# Patient Record
Sex: FEMALE | Race: WHITE | NOT HISPANIC OR LATINO | URBAN - METROPOLITAN AREA
[De-identification: names, ages, dates, MRNs, and addresses within clinical notes are randomized per-mention and may not be internally consistent; named-entity substitution may affect disease eponyms.]

---

## 2023-03-08 LAB
ALANINE AMINOTRANSFERASE (SGPT) (U/L) IN SER/PLAS: 13 U/L (ref 7–45)
ALBUMIN (G/DL) IN SER/PLAS: 4.2 G/DL (ref 3.4–5)
ALKALINE PHOSPHATASE (U/L) IN SER/PLAS: 68 U/L (ref 33–136)
ASPARTATE AMINOTRANSFERASE (SGOT) (U/L) IN SER/PLAS: 15 U/L (ref 9–39)
BASOPHILS (10*3/UL) IN BLOOD BY AUTOMATED COUNT: 0.02 X10E9/L (ref 0–0.1)
BASOPHILS/100 LEUKOCYTES IN BLOOD BY AUTOMATED COUNT: 0.2 % (ref 0–2)
BILIRUBIN DIRECT (MG/DL) IN SER/PLAS: 0.1 MG/DL (ref 0–0.3)
BILIRUBIN TOTAL (MG/DL) IN SER/PLAS: 0.3 MG/DL (ref 0–1.2)
C REACTIVE PROTEIN (MG/L) IN SER/PLAS: 0.94 MG/DL
CREATININE (MG/DL) IN SER/PLAS: 0.84 MG/DL (ref 0.5–1.05)
EOSINOPHILS (10*3/UL) IN BLOOD BY AUTOMATED COUNT: 0.05 X10E9/L (ref 0–0.4)
EOSINOPHILS/100 LEUKOCYTES IN BLOOD BY AUTOMATED COUNT: 0.5 % (ref 0–6)
ERYTHROCYTE DISTRIBUTION WIDTH (RATIO) BY AUTOMATED COUNT: 13.7 % (ref 11.5–14.5)
ERYTHROCYTE MEAN CORPUSCULAR HEMOGLOBIN CONCENTRATION (G/DL) BY AUTOMATED: 31.8 G/DL (ref 32–36)
ERYTHROCYTE MEAN CORPUSCULAR VOLUME (FL) BY AUTOMATED COUNT: 104 FL (ref 80–100)
ERYTHROCYTES (10*6/UL) IN BLOOD BY AUTOMATED COUNT: 4.07 X10E12/L (ref 4–5.2)
GFR FEMALE: 73 ML/MIN/1.73M2
HEMATOCRIT (%) IN BLOOD BY AUTOMATED COUNT: 42.4 % (ref 36–46)
HEMOGLOBIN (G/DL) IN BLOOD: 13.5 G/DL (ref 12–16)
IMMATURE GRANULOCYTES/100 LEUKOCYTES IN BLOOD BY AUTOMATED COUNT: 0.4 % (ref 0–0.9)
LEUKOCYTES (10*3/UL) IN BLOOD BY AUTOMATED COUNT: 10.5 X10E9/L (ref 4.4–11.3)
LYMPHOCYTES (10*3/UL) IN BLOOD BY AUTOMATED COUNT: 1.52 X10E9/L (ref 0.8–3)
LYMPHOCYTES/100 LEUKOCYTES IN BLOOD BY AUTOMATED COUNT: 14.5 % (ref 13–44)
MONOCYTES (10*3/UL) IN BLOOD BY AUTOMATED COUNT: 0.71 X10E9/L (ref 0.05–0.8)
MONOCYTES/100 LEUKOCYTES IN BLOOD BY AUTOMATED COUNT: 6.8 % (ref 2–10)
NEUTROPHILS (10*3/UL) IN BLOOD BY AUTOMATED COUNT: 8.17 X10E9/L (ref 1.6–5.5)
NEUTROPHILS/100 LEUKOCYTES IN BLOOD BY AUTOMATED COUNT: 77.6 % (ref 40–80)
NRBC (PER 100 WBCS) BY AUTOMATED COUNT: 0 /100 WBC (ref 0–0)
PLATELETS (10*3/UL) IN BLOOD AUTOMATED COUNT: 334 X10E9/L (ref 150–450)
PROTEIN TOTAL: 7 G/DL (ref 6.4–8.2)

## 2023-04-21 LAB
ALANINE AMINOTRANSFERASE (SGPT) (U/L) IN SER/PLAS: 8 U/L (ref 7–45)
ALBUMIN (G/DL) IN SER/PLAS: 4.1 G/DL (ref 3.4–5)
ALKALINE PHOSPHATASE (U/L) IN SER/PLAS: 66 U/L (ref 33–136)
ASPARTATE AMINOTRANSFERASE (SGOT) (U/L) IN SER/PLAS: 13 U/L (ref 9–39)
BASOPHILS (10*3/UL) IN BLOOD BY AUTOMATED COUNT: 0.02 X10E9/L (ref 0–0.1)
BASOPHILS/100 LEUKOCYTES IN BLOOD BY AUTOMATED COUNT: 0.2 % (ref 0–2)
BILIRUBIN DIRECT (MG/DL) IN SER/PLAS: 0.1 MG/DL (ref 0–0.3)
BILIRUBIN TOTAL (MG/DL) IN SER/PLAS: 0.3 MG/DL (ref 0–1.2)
C REACTIVE PROTEIN (MG/L) IN SER/PLAS: 1.64 MG/DL
CREATININE (MG/DL) IN SER/PLAS: 0.86 MG/DL (ref 0.5–1.05)
EOSINOPHILS (10*3/UL) IN BLOOD BY AUTOMATED COUNT: 0.07 X10E9/L (ref 0–0.4)
EOSINOPHILS/100 LEUKOCYTES IN BLOOD BY AUTOMATED COUNT: 0.7 % (ref 0–6)
ERYTHROCYTE DISTRIBUTION WIDTH (RATIO) BY AUTOMATED COUNT: 14.6 % (ref 11.5–14.5)
ERYTHROCYTE MEAN CORPUSCULAR HEMOGLOBIN CONCENTRATION (G/DL) BY AUTOMATED: 31.7 G/DL (ref 32–36)
ERYTHROCYTE MEAN CORPUSCULAR VOLUME (FL) BY AUTOMATED COUNT: 105 FL (ref 80–100)
ERYTHROCYTES (10*6/UL) IN BLOOD BY AUTOMATED COUNT: 3.89 X10E12/L (ref 4–5.2)
GFR FEMALE: 71 ML/MIN/1.73M2
HEMATOCRIT (%) IN BLOOD BY AUTOMATED COUNT: 41 % (ref 36–46)
HEMOGLOBIN (G/DL) IN BLOOD: 13 G/DL (ref 12–16)
IMMATURE GRANULOCYTES/100 LEUKOCYTES IN BLOOD BY AUTOMATED COUNT: 0.3 % (ref 0–0.9)
LEUKOCYTES (10*3/UL) IN BLOOD BY AUTOMATED COUNT: 10.2 X10E9/L (ref 4.4–11.3)
LYMPHOCYTES (10*3/UL) IN BLOOD BY AUTOMATED COUNT: 1.37 X10E9/L (ref 0.8–3)
LYMPHOCYTES/100 LEUKOCYTES IN BLOOD BY AUTOMATED COUNT: 13.4 % (ref 13–44)
MONOCYTES (10*3/UL) IN BLOOD BY AUTOMATED COUNT: 0.61 X10E9/L (ref 0.05–0.8)
MONOCYTES/100 LEUKOCYTES IN BLOOD BY AUTOMATED COUNT: 6 % (ref 2–10)
NEUTROPHILS (10*3/UL) IN BLOOD BY AUTOMATED COUNT: 8.1 X10E9/L (ref 1.6–5.5)
NEUTROPHILS/100 LEUKOCYTES IN BLOOD BY AUTOMATED COUNT: 79.4 % (ref 40–80)
NRBC (PER 100 WBCS) BY AUTOMATED COUNT: 0 /100 WBC (ref 0–0)
PLATELETS (10*3/UL) IN BLOOD AUTOMATED COUNT: 363 X10E9/L (ref 150–450)
PROTEIN TOTAL: 7.2 G/DL (ref 6.4–8.2)

## 2023-05-18 LAB
CREATININE (MG/DL) IN SER/PLAS: 0.81 MG/DL (ref 0.5–1.05)
GFR FEMALE: 76 ML/MIN/1.73M2
POC CALCIUM IONIZED (MMOL/L) IN BLOOD: 1.24 MMOL/L (ref 1.1–1.33)

## 2023-08-11 ENCOUNTER — HOSPITAL ENCOUNTER (OUTPATIENT)
Dept: DATA CONVERSION | Facility: HOSPITAL | Age: 73
Discharge: HOME | End: 2023-08-11

## 2023-08-11 DIAGNOSIS — Z85.118 PERSONAL HISTORY OF OTHER MALIGNANT NEOPLASM OF BRONCHUS AND LUNG: ICD-10-CM

## 2023-09-12 ENCOUNTER — HOSPITAL ENCOUNTER (OUTPATIENT)
Dept: DATA CONVERSION | Facility: HOSPITAL | Age: 73
Discharge: HOME | End: 2023-09-12
Payer: MEDICARE

## 2023-09-12 DIAGNOSIS — M21.061 VALGUS DEFORMITY, NOT ELSEWHERE CLASSIFIED, RIGHT KNEE: ICD-10-CM

## 2023-09-12 DIAGNOSIS — M17.11 UNILATERAL PRIMARY OSTEOARTHRITIS, RIGHT KNEE: ICD-10-CM

## 2023-10-27 DIAGNOSIS — M06.9 RHEUMATOID ARTHRITIS, INVOLVING UNSPECIFIED SITE, UNSPECIFIED WHETHER RHEUMATOID FACTOR PRESENT (MULTI): Primary | ICD-10-CM

## 2023-10-27 RX ORDER — PREDNISONE 1 MG/1
3 TABLET ORAL DAILY
Qty: 90 TABLET | Refills: 2 | Status: SHIPPED | OUTPATIENT
Start: 2023-10-27 | End: 2024-02-02 | Stop reason: SDUPTHER

## 2023-10-27 RX ORDER — PREDNISONE 1 MG/1
3 TABLET ORAL DAILY
COMMUNITY
End: 2023-10-27 | Stop reason: SDUPTHER

## 2023-11-02 ENCOUNTER — PHARMACY VISIT (OUTPATIENT)
Dept: PHARMACY | Facility: CLINIC | Age: 73
End: 2023-11-02
Payer: COMMERCIAL

## 2023-11-02 PROCEDURE — RXOTC WILLOW AMBULATORY OTC CHARGE

## 2023-11-02 PROCEDURE — RXMED WILLOW AMBULATORY MEDICATION CHARGE

## 2023-11-02 RX ORDER — HYDROCODONE BITARTRATE AND ACETAMINOPHEN 5; 325 MG/1; MG/1
TABLET ORAL
Qty: 90 TABLET | Refills: 0 | OUTPATIENT
Start: 2023-11-02 | End: 2023-11-13 | Stop reason: ALTCHOICE

## 2023-11-09 RX ORDER — CYANOCOBALAMIN 1000 UG/ML
1000 INJECTION, SOLUTION INTRAMUSCULAR; SUBCUTANEOUS
COMMUNITY
Start: 2023-09-06

## 2023-11-09 RX ORDER — CYCLOBENZAPRINE HCL 5 MG
10 TABLET ORAL 3 TIMES DAILY PRN
COMMUNITY
End: 2023-11-13 | Stop reason: ALTCHOICE

## 2023-11-09 RX ORDER — DENOSUMAB 60 MG/ML
60 INJECTION SUBCUTANEOUS ONCE
COMMUNITY
Start: 2018-04-06 | End: 2023-11-13 | Stop reason: ALTCHOICE

## 2023-11-09 RX ORDER — LIDOCAINE 560 MG/1
1 PATCH PERCUTANEOUS; TOPICAL; TRANSDERMAL DAILY
COMMUNITY
Start: 2022-06-07

## 2023-11-09 RX ORDER — CHOLESTYRAMINE 4 G/9G
4 POWDER, FOR SUSPENSION ORAL 2 TIMES DAILY
COMMUNITY
Start: 2023-09-06 | End: 2023-11-13 | Stop reason: ALTCHOICE

## 2023-11-09 RX ORDER — METOPROLOL TARTRATE 25 MG/1
25 TABLET, FILM COATED ORAL 2 TIMES DAILY
COMMUNITY

## 2023-11-09 RX ORDER — CHOLECALCIFEROL (VITAMIN D3) 25 MCG
1 TABLET ORAL DAILY
COMMUNITY
Start: 2022-04-26

## 2023-11-09 RX ORDER — NITROGLYCERIN 0.4 MG/1
0.4 TABLET SUBLINGUAL EVERY 5 MIN PRN
COMMUNITY
Start: 2015-01-21

## 2023-11-09 RX ORDER — HYDROXYCHLOROQUINE SULFATE 200 MG/1
200 TABLET, FILM COATED ORAL
COMMUNITY
End: 2023-11-13 | Stop reason: ALTCHOICE

## 2023-11-09 RX ORDER — ALBUTEROL SULFATE 90 UG/1
2 AEROSOL, METERED RESPIRATORY (INHALATION) EVERY 6 HOURS PRN
COMMUNITY
Start: 2022-12-01

## 2023-11-09 RX ORDER — PNV NO.95/FERROUS FUM/FOLIC AC 28MG-0.8MG
1 TABLET ORAL DAILY
COMMUNITY
Start: 2021-10-05 | End: 2023-11-13 | Stop reason: ALTCHOICE

## 2023-11-09 RX ORDER — UMECLIDINIUM BROMIDE AND VILANTEROL TRIFENATATE 62.5; 25 UG/1; UG/1
1 POWDER RESPIRATORY (INHALATION) DAILY
COMMUNITY

## 2023-11-09 RX ORDER — HYDROCODONE BITARTRATE AND ACETAMINOPHEN 5; 325 MG/1; MG/1
1 TABLET ORAL EVERY 6 HOURS PRN
COMMUNITY
Start: 2022-04-25

## 2023-11-09 RX ORDER — UMECLIDINIUM 62.5 UG/1
1 AEROSOL, POWDER ORAL EVERY 24 HOURS
COMMUNITY
End: 2023-11-13 | Stop reason: ALTCHOICE

## 2023-11-09 RX ORDER — AZATHIOPRINE 50 MG/1
50 TABLET ORAL DAILY
COMMUNITY
Start: 2015-01-27 | End: 2023-11-13 | Stop reason: ALTCHOICE

## 2023-11-09 RX ORDER — GABAPENTIN 400 MG/1
400 CAPSULE ORAL 2 TIMES DAILY
COMMUNITY

## 2023-11-09 RX ORDER — DICYCLOMINE HYDROCHLORIDE 10 MG/1
10 CAPSULE ORAL 3 TIMES DAILY
COMMUNITY
Start: 2023-05-10 | End: 2023-11-13 | Stop reason: ALTCHOICE

## 2023-11-09 RX ORDER — FUROSEMIDE 20 MG/1
20 TABLET ORAL EVERY OTHER DAY
COMMUNITY
Start: 2023-05-09

## 2023-11-09 RX ORDER — BROMFENAC 1.03 MG/ML
1 SOLUTION/ DROPS OPHTHALMIC 2 TIMES DAILY
COMMUNITY

## 2023-11-09 RX ORDER — NAPROXEN SODIUM 220 MG/1
81 TABLET, FILM COATED ORAL DAILY
COMMUNITY
Start: 2022-04-15

## 2023-11-11 PROBLEM — E53.8 VITAMIN B12 DEFICIENCY: Status: ACTIVE | Noted: 2023-11-11

## 2023-11-11 PROBLEM — Z99.81 OXYGEN DEPENDENT: Status: ACTIVE | Noted: 2023-11-11

## 2023-11-11 PROBLEM — I26.99 PULMONARY EMBOLUS (MULTI): Status: ACTIVE | Noted: 2023-11-11

## 2023-11-11 PROBLEM — R60.9 EDEMA: Status: ACTIVE | Noted: 2023-11-11

## 2023-11-11 PROBLEM — M79.7 FIBROMYALGIA: Status: ACTIVE | Noted: 2023-11-11

## 2023-11-11 PROBLEM — E63.9 NUTRITIONAL DEFICIENCY DISORDER: Status: ACTIVE | Noted: 2023-11-11

## 2023-11-11 PROBLEM — R93.7 ABNORMAL BONE DENSITY SCREENING: Status: ACTIVE | Noted: 2023-11-11

## 2023-11-11 PROBLEM — G89.18 POSTOPERATIVE PAIN: Status: ACTIVE | Noted: 2023-11-11

## 2023-11-11 PROBLEM — N95.9 MENOPAUSAL DISORDER: Status: ACTIVE | Noted: 2023-11-11

## 2023-11-11 PROBLEM — F32.A DEPRESSION: Status: ACTIVE | Noted: 2023-11-11

## 2023-11-11 PROBLEM — Z93.3 COLOSTOMY STATUS (MULTI): Status: ACTIVE | Noted: 2023-11-11

## 2023-11-11 PROBLEM — K57.32 DIVERTICULITIS OF SIGMOID COLON: Status: ACTIVE | Noted: 2023-11-11

## 2023-11-11 PROBLEM — R06.00 DYSPNEA: Status: ACTIVE | Noted: 2023-11-11

## 2023-11-11 PROBLEM — R73.09 BLOOD GLUCOSE ABNORMAL: Status: ACTIVE | Noted: 2023-11-11

## 2023-11-11 PROBLEM — I20.9 ANGINA PECTORIS (CMS-HCC): Status: ACTIVE | Noted: 2023-11-11

## 2023-11-11 PROBLEM — A41.9 SEPSIS (MULTI): Status: ACTIVE | Noted: 2023-11-11

## 2023-11-11 PROBLEM — F41.9 ANXIETY: Status: ACTIVE | Noted: 2023-11-11

## 2023-11-11 PROBLEM — R41.82 ALTERED MENTAL STATUS: Status: ACTIVE | Noted: 2023-11-11

## 2023-11-11 PROBLEM — M06.9 RHEUMATOID ARTHRITIS (MULTI): Status: ACTIVE | Noted: 2023-11-11

## 2023-11-11 PROBLEM — Z86.39 HISTORY OF DIABETES MELLITUS, TYPE II: Status: ACTIVE | Noted: 2023-11-11

## 2023-11-11 PROBLEM — G89.4 CHRONIC PAIN DISORDER: Status: ACTIVE | Noted: 2023-11-11

## 2023-11-11 PROBLEM — D72.9 WHITE BLOOD CELL DISORDER: Status: ACTIVE | Noted: 2023-11-11

## 2023-11-11 PROBLEM — M81.0 OSTEOPOROSIS: Status: ACTIVE | Noted: 2023-11-11

## 2023-11-11 PROBLEM — E03.9 ACQUIRED HYPOTHYROIDISM: Status: ACTIVE | Noted: 2023-11-11

## 2023-11-11 PROBLEM — F17.200 TOBACCO DEPENDENCE SYNDROME: Status: ACTIVE | Noted: 2023-11-11

## 2023-11-11 PROBLEM — J96.20 ACUTE ON CHRONIC RESPIRATORY FAILURE (MULTI): Status: ACTIVE | Noted: 2023-11-11

## 2023-11-11 PROBLEM — I73.9 PERIPHERAL VASCULAR DISEASE (CMS-HCC): Status: ACTIVE | Noted: 2023-11-11

## 2023-11-11 PROBLEM — E78.5 DYSLIPIDEMIA: Status: ACTIVE | Noted: 2023-11-11

## 2023-11-11 PROBLEM — K57.92 DIVERTICULITIS: Status: ACTIVE | Noted: 2023-11-11

## 2023-11-11 PROBLEM — K57.20 DIVERTICULITIS OF LARGE INTESTINE WITH PERFORATION WITHOUT BLEEDING: Status: ACTIVE | Noted: 2023-11-11

## 2023-11-11 PROBLEM — E55.9 VITAMIN D DEFICIENCY: Status: ACTIVE | Noted: 2023-11-11

## 2023-11-11 PROBLEM — K65.9 PERITONITIS (MULTI): Status: ACTIVE | Noted: 2023-11-11

## 2023-11-11 PROBLEM — R07.9 CHEST PAIN: Status: ACTIVE | Noted: 2023-11-11

## 2023-11-11 PROBLEM — E78.5 HYPERLIPIDEMIA: Status: ACTIVE | Noted: 2023-11-11

## 2023-11-11 PROBLEM — I10 HYPERTENSION: Status: ACTIVE | Noted: 2023-11-11

## 2023-11-11 PROBLEM — C34.90 SQUAMOUS CELL CARCINOMA OF LUNG (MULTI): Status: ACTIVE | Noted: 2023-11-11

## 2023-11-11 PROBLEM — E11.9 DIABETES (MULTI): Status: ACTIVE | Noted: 2023-11-11

## 2023-11-11 PROBLEM — R10.9 ABDOMINAL PAIN: Status: ACTIVE | Noted: 2023-11-11

## 2023-11-11 PROBLEM — M19.90 OSTEOARTHRITIS: Status: ACTIVE | Noted: 2023-11-11

## 2023-11-11 PROBLEM — J44.9 CHRONIC OBSTRUCTIVE PULMONARY DISEASE (MULTI): Status: ACTIVE | Noted: 2023-11-11

## 2023-11-11 RX ORDER — DENOSUMAB 60 MG/ML
60 INJECTION SUBCUTANEOUS
COMMUNITY

## 2023-11-11 RX ORDER — BLOOD SUGAR DIAGNOSTIC
STRIP MISCELLANEOUS
COMMUNITY
Start: 2017-11-30

## 2023-11-11 RX ORDER — BLOOD SUGAR DIAGNOSTIC
STRIP MISCELLANEOUS
COMMUNITY
Start: 2023-05-10

## 2023-11-11 RX ORDER — CETIRIZINE HYDROCHLORIDE 10 MG/1
10 TABLET, CHEWABLE ORAL DAILY
COMMUNITY
End: 2023-11-13 | Stop reason: ALTCHOICE

## 2023-11-11 RX ORDER — POLYETHYLENE GLYCOL 400 AND PROPYLENE GLYCOL 4; 3 MG/ML; MG/ML
1 SOLUTION/ DROPS OPHTHALMIC
COMMUNITY
End: 2023-11-13 | Stop reason: ALTCHOICE

## 2023-11-11 RX ORDER — PREDNISONE 1 MG/1
1 TABLET ORAL 3 TIMES DAILY
COMMUNITY
Start: 2015-02-24 | End: 2023-11-13 | Stop reason: ALTCHOICE

## 2023-11-11 RX ORDER — GABAPENTIN 400 MG/1
400 CAPSULE ORAL 2 TIMES DAILY
COMMUNITY
End: 2023-11-13 | Stop reason: ALTCHOICE

## 2023-11-13 ENCOUNTER — OFFICE VISIT (OUTPATIENT)
Dept: RHEUMATOLOGY | Facility: CLINIC | Age: 73
End: 2023-11-13
Payer: MEDICARE

## 2023-11-13 VITALS — BODY MASS INDEX: 25.57 KG/M2 | WEIGHT: 127 LBS | SYSTOLIC BLOOD PRESSURE: 116 MMHG | DIASTOLIC BLOOD PRESSURE: 58 MMHG

## 2023-11-13 DIAGNOSIS — M06.9 RHEUMATOID ARTHRITIS, INVOLVING UNSPECIFIED SITE, UNSPECIFIED WHETHER RHEUMATOID FACTOR PRESENT (MULTI): Primary | ICD-10-CM

## 2023-11-13 DIAGNOSIS — M81.0 OSTEOPOROSIS, UNSPECIFIED OSTEOPOROSIS TYPE, UNSPECIFIED PATHOLOGICAL FRACTURE PRESENCE: ICD-10-CM

## 2023-11-13 DIAGNOSIS — M19.90 OSTEOARTHRITIS, UNSPECIFIED OSTEOARTHRITIS TYPE, UNSPECIFIED SITE: ICD-10-CM

## 2023-11-13 PROCEDURE — 3074F SYST BP LT 130 MM HG: CPT | Performed by: INTERNAL MEDICINE

## 2023-11-13 PROCEDURE — 99214 OFFICE O/P EST MOD 30 MIN: CPT | Performed by: INTERNAL MEDICINE

## 2023-11-13 PROCEDURE — 1159F MED LIST DOCD IN RCRD: CPT | Performed by: INTERNAL MEDICINE

## 2023-11-13 PROCEDURE — 99214 OFFICE O/P EST MOD 30 MIN: CPT | Mod: PO | Performed by: INTERNAL MEDICINE

## 2023-11-13 PROCEDURE — 3078F DIAST BP <80 MM HG: CPT | Performed by: INTERNAL MEDICINE

## 2023-11-13 NOTE — PROGRESS NOTES
"Recheck    OA  /  RA  /  OP  ( due for Prolia in Dec )  Doing well overall.   Change in prednisone ( orange color ) with better results.    HPI - arthritis is \"not bad at all\"  No current pain.  She thinks this batch of pred is more effective than prev. Her R knee swells.  Her pain mgmt dr gave her injections with relief.  No other swelling.  Mild AM stifffness 1 hr.  No CP or resp.  Sometimes gets pain when her colostomy was reversed - plans on calling the surgeon    PE  NAD  RRR no r/m/g  CTA  Tr BLE edema  No synovitis    A/P - OA and RA - sx controlled with low-dose prednisone  She has not been able to marcelina hcq, metho, lef, AZA.  H/o lung CA and ruptured divertic abscess.  Could consider orencia if sx incr  OP - on prolia.  Recheck DEXA  "

## 2023-11-30 ENCOUNTER — PHARMACY VISIT (OUTPATIENT)
Dept: PHARMACY | Facility: CLINIC | Age: 73
End: 2023-11-30
Payer: COMMERCIAL

## 2023-11-30 PROCEDURE — RXMED WILLOW AMBULATORY MEDICATION CHARGE

## 2023-11-30 RX ORDER — HYDROCODONE BITARTRATE AND ACETAMINOPHEN 5; 325 MG/1; MG/1
TABLET ORAL
Qty: 90 TABLET | Refills: 0 | OUTPATIENT
Start: 2023-11-30 | End: 2023-12-28 | Stop reason: SDUPTHER

## 2023-11-30 RX ORDER — HYDROCODONE BITARTRATE AND ACETAMINOPHEN 5; 325 MG/1; MG/1
TABLET ORAL
Qty: 90 TABLET | Refills: 0 | OUTPATIENT
Start: 2023-11-30

## 2023-12-01 ENCOUNTER — LAB (OUTPATIENT)
Dept: LAB | Facility: LAB | Age: 73
End: 2023-12-01
Payer: MEDICARE

## 2023-12-01 DIAGNOSIS — M81.0 AGE-RELATED OSTEOPOROSIS WITHOUT CURRENT PATHOLOGICAL FRACTURE: Primary | ICD-10-CM

## 2023-12-01 LAB
CA-I BLD-SCNC: 1.2 MMOL/L (ref 1.1–1.33)
CREAT SERPL-MCNC: 0.89 MG/DL (ref 0.5–1.05)
GFR SERPL CREATININE-BSD FRML MDRD: 68 ML/MIN/1.73M*2

## 2023-12-01 PROCEDURE — 82565 ASSAY OF CREATININE: CPT

## 2023-12-01 PROCEDURE — 36415 COLL VENOUS BLD VENIPUNCTURE: CPT

## 2023-12-01 PROCEDURE — 82330 ASSAY OF CALCIUM: CPT

## 2023-12-05 DIAGNOSIS — M81.0 OSTEOPOROSIS, UNSPECIFIED OSTEOPOROSIS TYPE, UNSPECIFIED PATHOLOGICAL FRACTURE PRESENCE: Primary | ICD-10-CM

## 2023-12-05 RX ORDER — FAMOTIDINE 10 MG/ML
20 INJECTION INTRAVENOUS ONCE AS NEEDED
Status: CANCELLED | OUTPATIENT
Start: 2023-12-06

## 2023-12-05 RX ORDER — ALBUTEROL SULFATE 0.83 MG/ML
3 SOLUTION RESPIRATORY (INHALATION) AS NEEDED
Status: CANCELLED | OUTPATIENT
Start: 2023-12-06

## 2023-12-05 RX ORDER — DIPHENHYDRAMINE HYDROCHLORIDE 50 MG/ML
50 INJECTION INTRAMUSCULAR; INTRAVENOUS AS NEEDED
Status: CANCELLED | OUTPATIENT
Start: 2023-12-06

## 2023-12-05 RX ORDER — EPINEPHRINE 0.3 MG/.3ML
0.3 INJECTION SUBCUTANEOUS EVERY 5 MIN PRN
Status: CANCELLED | OUTPATIENT
Start: 2023-12-06

## 2023-12-08 ENCOUNTER — INFUSION (OUTPATIENT)
Dept: INFUSION THERAPY | Facility: CLINIC | Age: 73
End: 2023-12-08
Payer: MEDICARE

## 2023-12-08 VITALS
DIASTOLIC BLOOD PRESSURE: 72 MMHG | RESPIRATION RATE: 18 BRPM | TEMPERATURE: 98.4 F | OXYGEN SATURATION: 95 % | HEART RATE: 71 BPM | SYSTOLIC BLOOD PRESSURE: 127 MMHG

## 2023-12-08 DIAGNOSIS — M81.0 OSTEOPOROSIS, UNSPECIFIED OSTEOPOROSIS TYPE, UNSPECIFIED PATHOLOGICAL FRACTURE PRESENCE: ICD-10-CM

## 2023-12-08 PROCEDURE — 96372 THER/PROPH/DIAG INJ SC/IM: CPT | Mod: INF | Performed by: INTERNAL MEDICINE

## 2023-12-08 PROCEDURE — 2500000004 HC RX 250 GENERAL PHARMACY W/ HCPCS (ALT 636 FOR OP/ED): Mod: JZ | Performed by: INTERNAL MEDICINE

## 2023-12-08 RX ORDER — DIPHENHYDRAMINE HYDROCHLORIDE 50 MG/ML
50 INJECTION INTRAMUSCULAR; INTRAVENOUS AS NEEDED
Status: CANCELLED | OUTPATIENT
Start: 2024-06-05

## 2023-12-08 RX ORDER — EPINEPHRINE 0.3 MG/.3ML
0.3 INJECTION SUBCUTANEOUS EVERY 5 MIN PRN
Status: CANCELLED | OUTPATIENT
Start: 2024-06-05

## 2023-12-08 RX ORDER — OXYMETAZOLINE HCL 0.05 %
1 SPRAY, NON-AEROSOL (ML) NASAL 2 TIMES DAILY
COMMUNITY
Start: 2021-10-05

## 2023-12-08 RX ORDER — FAMOTIDINE 10 MG/ML
20 INJECTION INTRAVENOUS ONCE AS NEEDED
Status: CANCELLED | OUTPATIENT
Start: 2024-06-05

## 2023-12-08 RX ORDER — ISOSORBIDE MONONITRATE 30 MG/1
30 TABLET, EXTENDED RELEASE ORAL DAILY
COMMUNITY
Start: 2023-12-05

## 2023-12-08 RX ORDER — ALBUTEROL SULFATE 0.83 MG/ML
3 SOLUTION RESPIRATORY (INHALATION) AS NEEDED
Status: CANCELLED | OUTPATIENT
Start: 2024-06-05

## 2023-12-08 RX ADMIN — DENOSUMAB 60 MG: 60 INJECTION SUBCUTANEOUS at 14:03

## 2023-12-08 ASSESSMENT — ENCOUNTER SYMPTOMS
DEPRESSION: 0
OCCASIONAL FEELINGS OF UNSTEADINESS: 0
LOSS OF SENSATION IN FEET: 0

## 2023-12-08 ASSESSMENT — PAIN SCALES - GENERAL: PAINLEVEL: 4

## 2023-12-08 NOTE — PROGRESS NOTES
[facility]   infusion Clinic Note   Date: 2023   Name: Sania Rosenberg  : 1950   MRN: [id number]         Reason for Visit:   Injections (Prolia)      Accompanied by:Self   Visit Type:: injection   Diagnosis: Osteoporosis, unspecified osteoporosis type, unspecified pathological fracture presence    Allergies:   Allergies as of 2023 - Reviewed 2023   Allergen Reaction Noted    Leflunomide Anaphylaxis and Swelling 2023    Penicillins Hives and Anaphylaxis 2023    Acetaminophen-codeine Swelling 2023    Albuterol Other, Headache, and Dizziness 2023    Clindamycin Rash and Swelling 2023    Ibuprofen-famotidine Rash and Swelling 2023    Lanolin Swelling 2023    Tramadol Nausea/vomiting, GI Upset, and Rash 2023    Calamine Unknown 2023    Calamine-zinc oxide Unknown 2023    Lactose Unknown 2023    Losartan potassium Other 2023    Cephalexin Itching, Swelling, and Rash 2023    Ciprofloxacin Rash 2023    Clarithromycin GI Upset 2023    Codeine Rash 2023    Levofloxacin Rash 2023    Lisinopril Rash, Other, and GI Upset 2023    Methotrexate GI Upset and Rash 2023    Neomycin-bacitracin-polymyxin Rash 2023    Nicotine Itching 2023    Other Rash 2023    Oxycodone-acetaminophen Rash 2023    Pregabalin Rash 2023    Propoxyphene n-acetaminophen Rash 2023    Sulfa (sulfonamide antibiotics) Rash 2023    Sulfamethoxazole-trimethoprim Rash 2023    Tetracycline Rash 2023      Current Meds has a current medication list which includes the following prescription(s): accu-chek nadia plus test strp, albuterol, aspirin, accu-chek nadia plus test strp, cetirizine, cholecalciferol, cyanocobalamin, prolia, furosemide, gabapentin, isosorbide mononitrate er, metoprolol tartrate, oxygen, oxymetazoline, peg 400-propylene glycol,  prednisone, anoro ellipta, bromfenac, hydrocodone-acetaminophen, hydrocodone-acetaminophen, lidocaine, and nitrostat.        Vitals:  Vitals:    12/08/23 1355   BP: 127/72   Pulse: 71   Resp: 18   Temp: 36.9 °C (98.4 °F)   SpO2: 95%      Infusion Pre-procedure Checklist   Allergies reviewed: yes   Medications reviewed: yes   Contraindications to treatment: no   Previous reaction to current treatment: no   Current Health Issues: None   Pain: 4 [4]' Back [4]   Is the pain different from normal: No   Is the pain tolerable: Yes   Is your Doctor aware: Yes   Contraindications based on patient history: No   Provider notified: Not applicable   Labs:   Creatinine   Date Value Ref Range Status   12/01/2023 0.89 0.50 - 1.05 mg/dL Final      POCT Calcium, Ionized   Date Value Ref Range Status   12/01/2023 1.20 1.1 - 1.33 mmol/L Final     Comment:     The performance characteristics of ionized calcium tested  in heparinized plasma or serum have been validated by the  individual  laboratory site where testing is performed.   Testing on heparinized plasma or serum is not approved by   the FDA; however, such approval is not necessary.       Fall Risk Screening:      Review of Systems   All other systems reviewed and are negative.     Negative for complaint: [] all other systems have been reviewed and are negative for complaint   Infusion Readiness:   Assess patient for the concerns below. Document provider notification as appropriate:  - Does not meet criteria to treat No  - Has an active or recent infection with/without current antibiotic use No  - Has recent/planned dental work No  -Pt confirms calcium supplementation Yes    Initiated By: More Gregorio RN   Time: 2:04 PM     We administered denosumab.    Patient visit conducted today by More Gregorio RN for administration of Prolia Subcutaneous injection administered in left arm(s) and well-tolerated.

## 2023-12-08 NOTE — PATIENT INSTRUCTIONS
Today :We administered denosumab.     For:   1. Osteoporosis, unspecified osteoporosis type, unspecified pathological fracture presence         Your next appointment is due in:  6/5/2024        Please read the  Medication Guide that was given to you and reviewed during todays visit.     (Tell all doctors including dentists that you are taking this medication)     Go to the emergency room or call 911 if:  -You have signs of allergic reaction:   -Rash, hives, itching.   -Swollen, blistered, peeling skin.   -Swelling of face, lips, mouth, tongue or throat.   -Tightness of chest, trouble breathing, swallowing or talking     Call your doctor:  - If IV / injection site gets red, warm, swollen, itchy or leaks fluid or pus.     (Leave dressing on your IV site for at least 2 hours and keep area clean and dry  - If you get sick or have symptoms of infection or are not feeling well for any reason.    (Wash your hands often, stay away from people who are sick)  - If you have side effects from your medication that do not go away or are bothersome.     (Refer to the teaching your nurse gave you for side effects to call your doctor about)    - Common side effects may include:  stuffy nose, headache, feeling tired, muscle aches, upset stomach  - Before receiving any vaccines     - Call the [facility] at   If:  - You get sick, are on antibiotics, have had a recent vaccine, have surgery or dental work and your doctor wants your visit rescheduled.  - You need to cancel and reschedule your visit for any reason. Call at least 2 days before your visit if you need to cancel.   - Your insurance changes before your next visit.    (We will need to get approval from your new insurance. This can take up to two weeks.)     The [facility] is opened Monday thru Friday. We are closed on weekends and holidays.   Voice mail will take your call if the center is closed. If you leave a message please allow 24 hours for  a call back during weekdays. If you leave a message on a weekend/holiday, we will call you back the next business day.

## 2023-12-28 PROCEDURE — RXMED WILLOW AMBULATORY MEDICATION CHARGE

## 2023-12-30 ENCOUNTER — PHARMACY VISIT (OUTPATIENT)
Dept: PHARMACY | Facility: CLINIC | Age: 73
End: 2023-12-30
Payer: COMMERCIAL

## 2023-12-30 PROCEDURE — RXOTC WILLOW AMBULATORY OTC CHARGE

## 2024-01-02 ENCOUNTER — HOSPITAL ENCOUNTER (OUTPATIENT)
Dept: RADIOLOGY | Facility: HOSPITAL | Age: 74
Discharge: HOME | End: 2024-01-02
Payer: MEDICARE

## 2024-01-02 DIAGNOSIS — Z85.118 PERSONAL HISTORY OF OTHER MALIGNANT NEOPLASM OF BRONCHUS AND LUNG: ICD-10-CM

## 2024-01-02 PROCEDURE — 71250 CT THORAX DX C-: CPT

## 2024-01-31 PROCEDURE — RXMED WILLOW AMBULATORY MEDICATION CHARGE

## 2024-02-02 ENCOUNTER — PHARMACY VISIT (OUTPATIENT)
Dept: PHARMACY | Facility: CLINIC | Age: 74
End: 2024-02-02
Payer: COMMERCIAL

## 2024-02-02 DIAGNOSIS — M06.9 RHEUMATOID ARTHRITIS, INVOLVING UNSPECIFIED SITE, UNSPECIFIED WHETHER RHEUMATOID FACTOR PRESENT (MULTI): ICD-10-CM

## 2024-02-02 PROCEDURE — RXOTC WILLOW AMBULATORY OTC CHARGE

## 2024-02-02 RX ORDER — PREDNISONE 1 MG/1
3 TABLET ORAL DAILY
Qty: 90 TABLET | Refills: 1 | Status: SHIPPED | OUTPATIENT
Start: 2024-02-02 | End: 2024-04-04 | Stop reason: SDUPTHER

## 2024-02-12 ENCOUNTER — LAB (OUTPATIENT)
Dept: LAB | Facility: LAB | Age: 74
End: 2024-02-12
Payer: MEDICARE

## 2024-02-12 ENCOUNTER — OFFICE VISIT (OUTPATIENT)
Dept: RHEUMATOLOGY | Facility: CLINIC | Age: 74
End: 2024-02-12
Payer: MEDICARE

## 2024-02-12 VITALS — BODY MASS INDEX: 25.37 KG/M2 | SYSTOLIC BLOOD PRESSURE: 136 MMHG | DIASTOLIC BLOOD PRESSURE: 68 MMHG | WEIGHT: 126 LBS

## 2024-02-12 DIAGNOSIS — M06.9 RHEUMATOID ARTHRITIS, INVOLVING UNSPECIFIED SITE, UNSPECIFIED WHETHER RHEUMATOID FACTOR PRESENT (MULTI): Primary | ICD-10-CM

## 2024-02-12 DIAGNOSIS — I42.8 OTHER CARDIOMYOPATHIES (MULTI): ICD-10-CM

## 2024-02-12 DIAGNOSIS — I25.10 ATHEROSCLEROTIC HEART DISEASE OF NATIVE CORONARY ARTERY WITHOUT ANGINA PECTORIS: Primary | ICD-10-CM

## 2024-02-12 DIAGNOSIS — M19.90 OSTEOARTHRITIS, UNSPECIFIED OSTEOARTHRITIS TYPE, UNSPECIFIED SITE: ICD-10-CM

## 2024-02-12 DIAGNOSIS — M81.0 OSTEOPOROSIS, UNSPECIFIED OSTEOPOROSIS TYPE, UNSPECIFIED PATHOLOGICAL FRACTURE PRESENCE: ICD-10-CM

## 2024-02-12 LAB — BNP SERPL-MCNC: 90 PG/ML (ref 0–99)

## 2024-02-12 PROCEDURE — 83880 ASSAY OF NATRIURETIC PEPTIDE: CPT

## 2024-02-12 PROCEDURE — 99214 OFFICE O/P EST MOD 30 MIN: CPT | Performed by: INTERNAL MEDICINE

## 2024-02-12 PROCEDURE — 1125F AMNT PAIN NOTED PAIN PRSNT: CPT | Performed by: INTERNAL MEDICINE

## 2024-02-12 PROCEDURE — 36415 COLL VENOUS BLD VENIPUNCTURE: CPT

## 2024-02-12 PROCEDURE — 3075F SYST BP GE 130 - 139MM HG: CPT | Performed by: INTERNAL MEDICINE

## 2024-02-12 PROCEDURE — 3078F DIAST BP <80 MM HG: CPT | Performed by: INTERNAL MEDICINE

## 2024-02-12 NOTE — PROGRESS NOTES
"Recheck  OA  /  RA  /  OP  ( Prolia in Dec ) Doing well      >20 min late - thought appt was later  HPI - \"Not too bad\"  She incr pain \"everywhere\" with cold weather.  Her hands are ok.  Her legs are swelling - she was put on lasix.   AM stiffness until she moves around.  +CP - sees cardiol.  +SOB.   Intermittent GI sx since she had surg in 2022.      PE  NAD  RRR no r/m/g  CTA  No edema  No synovitis    A/P- OA and RA - doing well on low-dose pred.  She has been intol of mult meds  OP on prolia - she hasn't had her DEXA yet as ordered but plans on doing so  Reviewed prev labs  No need to check labs today  Reeval 3 mo  "

## 2024-02-16 ENCOUNTER — HOSPITAL ENCOUNTER (OUTPATIENT)
Dept: RADIOLOGY | Facility: HOSPITAL | Age: 74
Discharge: HOME | End: 2024-02-16
Payer: MEDICARE

## 2024-02-16 DIAGNOSIS — M25.552 PAIN IN LEFT HIP: ICD-10-CM

## 2024-02-16 DIAGNOSIS — M25.551 PAIN IN RIGHT HIP: ICD-10-CM

## 2024-02-16 PROCEDURE — 73521 X-RAY EXAM HIPS BI 2 VIEWS: CPT

## 2024-02-16 PROCEDURE — 73523 X-RAY EXAM HIPS BI 5/> VIEWS: CPT

## 2024-02-29 PROCEDURE — RXMED WILLOW AMBULATORY MEDICATION CHARGE

## 2024-03-01 ENCOUNTER — PHARMACY VISIT (OUTPATIENT)
Dept: PHARMACY | Facility: CLINIC | Age: 74
End: 2024-03-01
Payer: COMMERCIAL

## 2024-03-13 DIAGNOSIS — I25.10 ATHEROSCLEROTIC HEART DISEASE OF NATIVE CORONARY ARTERY WITHOUT ANGINA PECTORIS: Primary | ICD-10-CM

## 2024-03-13 DIAGNOSIS — I10 ESSENTIAL (PRIMARY) HYPERTENSION: ICD-10-CM

## 2024-03-13 DIAGNOSIS — R07.89 OTHER CHEST PAIN: ICD-10-CM

## 2024-03-14 ENCOUNTER — LAB (OUTPATIENT)
Dept: LAB | Facility: LAB | Age: 74
End: 2024-03-14
Payer: MEDICARE

## 2024-03-14 DIAGNOSIS — R07.89 OTHER CHEST PAIN: ICD-10-CM

## 2024-03-14 DIAGNOSIS — I10 ESSENTIAL (PRIMARY) HYPERTENSION: ICD-10-CM

## 2024-03-14 DIAGNOSIS — I25.10 ATHEROSCLEROTIC HEART DISEASE OF NATIVE CORONARY ARTERY WITHOUT ANGINA PECTORIS: ICD-10-CM

## 2024-03-14 LAB
ANION GAP SERPL CALC-SCNC: 11 MMOL/L
BASOPHILS # BLD AUTO: 0.03 X10*3/UL (ref 0–0.1)
BASOPHILS NFR BLD AUTO: 0.3 %
BUN SERPL-MCNC: 20 MG/DL (ref 8–25)
CALCIUM SERPL-MCNC: 9 MG/DL (ref 8.5–10.4)
CHLORIDE SERPL-SCNC: 103 MMOL/L (ref 97–107)
CO2 SERPL-SCNC: 28 MMOL/L (ref 24–31)
CREAT SERPL-MCNC: 0.9 MG/DL (ref 0.4–1.6)
EGFRCR SERPLBLD CKD-EPI 2021: 67 ML/MIN/1.73M*2
EOSINOPHIL # BLD AUTO: 0.11 X10*3/UL (ref 0–0.4)
EOSINOPHIL NFR BLD AUTO: 1.2 %
ERYTHROCYTE [DISTWIDTH] IN BLOOD BY AUTOMATED COUNT: 13.4 % (ref 11.5–14.5)
GLUCOSE SERPL-MCNC: 118 MG/DL (ref 65–99)
HCT VFR BLD AUTO: 43.1 % (ref 36–46)
HGB BLD-MCNC: 13.7 G/DL (ref 12–16)
IMM GRANULOCYTES # BLD AUTO: 0.04 X10*3/UL (ref 0–0.5)
IMM GRANULOCYTES NFR BLD AUTO: 0.4 % (ref 0–0.9)
INR PPP: 1 (ref 0.9–1.2)
LYMPHOCYTES # BLD AUTO: 1.48 X10*3/UL (ref 0.8–3)
LYMPHOCYTES NFR BLD AUTO: 15.6 %
MCH RBC QN AUTO: 32.2 PG (ref 26–34)
MCHC RBC AUTO-ENTMCNC: 31.8 G/DL (ref 32–36)
MCV RBC AUTO: 101 FL (ref 80–100)
MONOCYTES # BLD AUTO: 0.66 X10*3/UL (ref 0.05–0.8)
MONOCYTES NFR BLD AUTO: 7 %
NEUTROPHILS # BLD AUTO: 7.14 X10*3/UL (ref 1.6–5.5)
NEUTROPHILS NFR BLD AUTO: 75.5 %
NRBC BLD-RTO: 0 /100 WBCS (ref 0–0)
PLATELET # BLD AUTO: 289 X10*3/UL (ref 150–450)
POTASSIUM SERPL-SCNC: 4.5 MMOL/L (ref 3.4–5.1)
PROTHROMBIN TIME: 10.3 SECONDS (ref 9.3–12.7)
RBC # BLD AUTO: 4.25 X10*6/UL (ref 4–5.2)
SODIUM SERPL-SCNC: 142 MMOL/L (ref 133–145)
WBC # BLD AUTO: 9.5 X10*3/UL (ref 4.4–11.3)

## 2024-03-14 PROCEDURE — 36415 COLL VENOUS BLD VENIPUNCTURE: CPT

## 2024-03-14 PROCEDURE — 85610 PROTHROMBIN TIME: CPT

## 2024-03-14 PROCEDURE — 80048 BASIC METABOLIC PNL TOTAL CA: CPT

## 2024-03-14 PROCEDURE — 85025 COMPLETE CBC W/AUTO DIFF WBC: CPT

## 2024-03-18 ENCOUNTER — HOSPITAL ENCOUNTER (OUTPATIENT)
Facility: HOSPITAL | Age: 74
Setting detail: OUTPATIENT SURGERY
Discharge: HOME | End: 2024-03-18
Attending: INTERNAL MEDICINE | Admitting: INTERNAL MEDICINE
Payer: MEDICARE

## 2024-03-18 VITALS
RESPIRATION RATE: 18 BRPM | TEMPERATURE: 98 F | SYSTOLIC BLOOD PRESSURE: 164 MMHG | DIASTOLIC BLOOD PRESSURE: 68 MMHG | OXYGEN SATURATION: 100 % | HEART RATE: 56 BPM

## 2024-03-18 DIAGNOSIS — R07.9 CHEST PAIN, UNSPECIFIED: ICD-10-CM

## 2024-03-18 DIAGNOSIS — I20.0 UNSTABLE ANGINA (MULTI): ICD-10-CM

## 2024-03-18 PROCEDURE — 7100000010 HC PHASE TWO TIME - EACH INCREMENTAL 1 MINUTE: Performed by: INTERNAL MEDICINE

## 2024-03-18 PROCEDURE — 2500000004 HC RX 250 GENERAL PHARMACY W/ HCPCS (ALT 636 FOR OP/ED): Performed by: INTERNAL MEDICINE

## 2024-03-18 PROCEDURE — 99152 MOD SED SAME PHYS/QHP 5/>YRS: CPT | Performed by: INTERNAL MEDICINE

## 2024-03-18 PROCEDURE — 99153 MOD SED SAME PHYS/QHP EA: CPT | Performed by: INTERNAL MEDICINE

## 2024-03-18 PROCEDURE — 2720000007 HC OR 272 NO HCPCS: Performed by: INTERNAL MEDICINE

## 2024-03-18 PROCEDURE — C1894 INTRO/SHEATH, NON-LASER: HCPCS | Performed by: INTERNAL MEDICINE

## 2024-03-18 PROCEDURE — 2550000001 HC RX 255 CONTRASTS: Performed by: INTERNAL MEDICINE

## 2024-03-18 PROCEDURE — 93458 L HRT ARTERY/VENTRICLE ANGIO: CPT | Performed by: INTERNAL MEDICINE

## 2024-03-18 PROCEDURE — G0269 OCCLUSIVE DEVICE IN VEIN ART: HCPCS | Mod: TC | Performed by: INTERNAL MEDICINE

## 2024-03-18 PROCEDURE — 7100000009 HC PHASE TWO TIME - INITIAL BASE CHARGE: Performed by: INTERNAL MEDICINE

## 2024-03-18 PROCEDURE — 2500000005 HC RX 250 GENERAL PHARMACY W/O HCPCS: Performed by: INTERNAL MEDICINE

## 2024-03-18 RX ORDER — MIDAZOLAM HYDROCHLORIDE 1 MG/ML
INJECTION, SOLUTION INTRAMUSCULAR; INTRAVENOUS AS NEEDED
Status: DISCONTINUED | OUTPATIENT
Start: 2024-03-18 | End: 2024-03-18 | Stop reason: HOSPADM

## 2024-03-18 RX ORDER — FENTANYL CITRATE 50 UG/ML
INJECTION, SOLUTION INTRAMUSCULAR; INTRAVENOUS AS NEEDED
Status: DISCONTINUED | OUTPATIENT
Start: 2024-03-18 | End: 2024-03-18 | Stop reason: HOSPADM

## 2024-03-18 RX ORDER — LIDOCAINE HYDROCHLORIDE 10 MG/ML
INJECTION, SOLUTION EPIDURAL; INFILTRATION; INTRACAUDAL; PERINEURAL AS NEEDED
Status: DISCONTINUED | OUTPATIENT
Start: 2024-03-18 | End: 2024-03-18 | Stop reason: HOSPADM

## 2024-03-18 RX ORDER — IODIXANOL 320 MG/ML
INJECTION, SOLUTION INTRAVASCULAR AS NEEDED
Status: DISCONTINUED | OUTPATIENT
Start: 2024-03-18 | End: 2024-03-18 | Stop reason: HOSPADM

## 2024-03-18 ASSESSMENT — PAIN SCALES - GENERAL
PAINLEVEL_OUTOF10: 0 - NO PAIN
PAINLEVEL_OUTOF10: 0 - NO PAIN
PAINLEVEL_OUTOF10: 3
PAINLEVEL_OUTOF10: 0 - NO PAIN

## 2024-03-18 ASSESSMENT — COLUMBIA-SUICIDE SEVERITY RATING SCALE - C-SSRS
1. IN THE PAST MONTH, HAVE YOU WISHED YOU WERE DEAD OR WISHED YOU COULD GO TO SLEEP AND NOT WAKE UP?: NO
6. HAVE YOU EVER DONE ANYTHING, STARTED TO DO ANYTHING, OR PREPARED TO DO ANYTHING TO END YOUR LIFE?: NO
2. HAVE YOU ACTUALLY HAD ANY THOUGHTS OF KILLING YOURSELF?: NO

## 2024-03-18 ASSESSMENT — PAIN - FUNCTIONAL ASSESSMENT
PAIN_FUNCTIONAL_ASSESSMENT: 0-10

## 2024-03-18 NOTE — POST-PROCEDURE NOTE
Physician Transition of Care Summary  Invasive Cardiovascular Lab    Procedure Date: 3/18/2024  Attending:    * Jerad Doherty - Primary  Resident/Fellow/Other Assistant: Surgeon(s) and Role:    Indications:   Pre-op Diagnosis     * Chest pain, unspecified [R07.9]    Post-procedure diagnosis:   Post-op Diagnosis     * Chest pain, unspecified [R07.9]    Procedure(s):   Left Heart Cath, No LV  99392 - AZ CATH PLMT L HRT & ARTS W/NJX & ANGIO IMG S&I        Procedure Findings:   #1 left heart catheterization showed widely patent stent in the proximal right coronary artery with no evidence of inducible stenosis.  There is evidence of 40% eccentric plaque in the distal right coronary artery.  The left descending coronary artery is no significant the sclerosis of the distal LAD is a very small caliber vessel   Left circumflex coronary is a nondominant vessel in the proximal segment of the first obtuse marginal branch has a 60 to 70% focal stenosis.    description of the Procedure:   Cardiac cath report    Complications:   None    Stents/Implants:       Anticoagulation/Antiplatelet Plan:   Continue aspirin indefinitely    Estimated Blood Loss:   * No values recorded between 3/18/2024 11:20 AM and 3/18/2024 12:27 PM *    Anesthesia: Moderate Sedation Anesthesia Staff: No anesthesia staff entered.    Any Specimen(s) Removed:   No specimens collected during this procedure.    Disposition:   Home      Electronically signed by: Jerad Doherty MD, 3/18/2024 12:27 PM

## 2024-03-20 ASSESSMENT — PAIN SCALES - GENERAL: PAINLEVEL_OUTOF10: 0 - NO PAIN

## 2024-03-28 PROCEDURE — RXMED WILLOW AMBULATORY MEDICATION CHARGE

## 2024-03-29 ENCOUNTER — PHARMACY VISIT (OUTPATIENT)
Dept: PHARMACY | Facility: CLINIC | Age: 74
End: 2024-03-29
Payer: COMMERCIAL

## 2024-03-29 PROCEDURE — RXOTC WILLOW AMBULATORY OTC CHARGE

## 2024-04-04 DIAGNOSIS — M06.9 RHEUMATOID ARTHRITIS, INVOLVING UNSPECIFIED SITE, UNSPECIFIED WHETHER RHEUMATOID FACTOR PRESENT (MULTI): ICD-10-CM

## 2024-04-04 RX ORDER — PREDNISONE 1 MG/1
3 TABLET ORAL DAILY
Qty: 90 TABLET | Refills: 1 | Status: SHIPPED | OUTPATIENT
Start: 2024-04-04 | End: 2024-06-03 | Stop reason: SDUPTHER

## 2024-04-26 ENCOUNTER — DOCUMENTATION (OUTPATIENT)
Dept: INFUSION THERAPY | Facility: CLINIC | Age: 74
End: 2024-04-26
Payer: MEDICARE

## 2024-04-26 DIAGNOSIS — M81.0 OSTEOPOROSIS, UNSPECIFIED OSTEOPOROSIS TYPE, UNSPECIFIED PATHOLOGICAL FRACTURE PRESENCE: Primary | ICD-10-CM

## 2024-04-26 PROCEDURE — RXMED WILLOW AMBULATORY MEDICATION CHARGE

## 2024-04-26 NOTE — PROGRESS NOTES
Patient to be scheduled for Continuation of Prolia injections.  For Diagnosis: Osteoporosis    Labs.. 11/12/2024  Calcium drawn on: Ionized Ca+ 1.17  Lab Results   Component Value Date    CALCIUM 9.0 03/14/2024    PHOS 2.2 (L) 01/19/2019      Lab Results   Component Value Date    CAION 1.20 12/01/2023      Lab Results   Component Value Date    GLUCOSE 118 (H) 03/14/2024    CALCIUM 9.0 03/14/2024     03/14/2024    K 4.5 03/14/2024    CO2 28 03/14/2024     03/14/2024    BUN 20 03/14/2024    CREATININE 0.90 03/14/2024        Chemistry    Lab Results   Component Value Date/Time     03/14/2024 1355    K 4.5 03/14/2024 1355     03/14/2024 1355    CO2 28 03/14/2024 1355    BUN 20 03/14/2024 1355    CREATININE 0.90 03/14/2024 1355    Lab Results   Component Value Date/Time    CALCIUM 9.0 03/14/2024 1355    ALKPHOS 66 04/20/2023 1326    AST 13 04/20/2023 1326    ALT 8 04/20/2023 1326    BILITOT 0.3 04/20/2023 1326           (>8.6mg/dl or ionized calcium WNL.  Hold / Contact provider if <8.6) (must be within 28 days of scheduled injection)    Lab Results   Component Value Date    CREATININE 0.90 03/14/2024        Crcl: 51.80 ml/min  (Patients with a crcl <30 are at increased risk of hypocalcemia)      *Not a hard stop. If crcl < 30 pt to discuss supplementation with prescribing provider.    Calcium and Vitamin D supplement on medication list? Yes    Current Outpatient Medications:     Accu-Chek Aicha Plus test strp strip, 100 Strips once daily. Use as instructed to test blood sugars once daily., Disp: , Rfl:     albuterol 90 mcg/actuation inhaler, Inhale 2 puffs every 6 hours if needed., Disp: , Rfl:     aspirin 81 mg chewable tablet, Chew 1 tablet (81 mg) once daily., Disp: , Rfl:     blood sugar diagnostic (Accu-Chek Aicha Plus test strp) strip, use as directed once daily., Disp: , Rfl:     bromfenac (Xibrom) 0.09 % ophthalmic solution, Administer 1 drop into both eyes 2 times a day., Disp: , Rfl:      cetirizine (ZyrTEC) 10 mg capsule, Take 1 capsule (10 mg) by mouth 2 times a day., Disp: , Rfl:     cholecalciferol (Vitamin D-3) 25 MCG (1000 UT) tablet, Take 1 tablet (25 mcg) by mouth once daily., Disp: , Rfl:     cyanocobalamin (Vitamin B-12) 1,000 mcg/mL injection, Inject 1 mL (1,000 mcg) into the muscle every 30 (thirty) days., Disp: , Rfl:     denosumab (Prolia) 60 mg/mL syringe, Inject 1 mL (60 mg) under the skin every 6 months., Disp: , Rfl:     furosemide (Lasix) 20 mg tablet, Take 1 tablet (20 mg) by mouth every other day., Disp: , Rfl:     gabapentin (Neurontin) 400 mg capsule, Take 1 capsule (400 mg) by mouth 2 times a day., Disp: , Rfl:     HYDROcodone-acetaminophen (Norco) 5-325 mg tablet, Take 1 tablet by mouth every 6 hours if needed., Disp: , Rfl:     HYDROcodone-acetaminophen (Norco) 5-325 mg tablet, Take 1 tablet by mouth every 8 hours as needed for 30 days, Disp: 90 tablet, Rfl: 0    isosorbide mononitrate ER (Imdur) 30 mg 24 hr tablet, Take 1 tablet (30 mg) by mouth once daily., Disp: , Rfl:     lidocaine 4 % patch, Place 1 patch on the skin once daily., Disp: , Rfl:     metoprolol tartrate (Lopressor) 25 mg tablet, Take 1 tablet (25 mg) by mouth 2 times a day., Disp: , Rfl:     Nitrostat 0.4 mg SL tablet, Place 1 tablet (0.4 mg) under the tongue every 5 minutes if needed., Disp: , Rfl:     oxygen (O2) gas therapy, once daily at bedtime., Disp: , Rfl:     oxymetazoline 0.05 % nasal spray, Administer 1 spray into affected nostril(s) twice a day., Disp: , Rfl:     peg 400-propylene glycol (SYSTANE) 0.4-0.3 % drops ophthalmic drops, Administer 2 drops into both eyes 2 times a day as needed., Disp: , Rfl:     predniSONE (Deltasone) 1 mg tablet, Take 3 tablets (3 mg) by mouth once daily., Disp: 90 tablet, Rfl: 1    umeclidinium-vilanteroL (Anoro Ellipta) 62.5-25 mcg/actuation blister with device, Inhale 1 puff once daily., Disp: , Rfl:    (if no nurse to encourage discussion of supplementation  at visit)    No obvious recent dental work per chart review. Nurse to confirm no dental within past/next 4 weeks at encounter.        Last injection received: 6/5/2024 (if continuation)    Due: 12/10/2024      This result meets treatment criteria. Ok to schedule for prolia within 28 days of the calcium lab draw date listed above. OR… Ok to schedule for prolia; please instruct pt to have labs drawn no more than 28 days prior to their scheduled appointment

## 2024-04-27 ENCOUNTER — PHARMACY VISIT (OUTPATIENT)
Dept: PHARMACY | Facility: CLINIC | Age: 74
End: 2024-04-27
Payer: COMMERCIAL

## 2024-05-13 ENCOUNTER — LAB (OUTPATIENT)
Dept: LAB | Facility: LAB | Age: 74
End: 2024-05-13
Payer: MEDICARE

## 2024-05-13 ENCOUNTER — OFFICE VISIT (OUTPATIENT)
Dept: RHEUMATOLOGY | Facility: CLINIC | Age: 74
End: 2024-05-13
Payer: MEDICARE

## 2024-05-13 VITALS — BODY MASS INDEX: 26.37 KG/M2 | SYSTOLIC BLOOD PRESSURE: 126 MMHG | DIASTOLIC BLOOD PRESSURE: 66 MMHG | WEIGHT: 131 LBS

## 2024-05-13 DIAGNOSIS — M81.0 OSTEOPOROSIS, UNSPECIFIED OSTEOPOROSIS TYPE, UNSPECIFIED PATHOLOGICAL FRACTURE PRESENCE: ICD-10-CM

## 2024-05-13 DIAGNOSIS — L98.9 SKIN LESION: ICD-10-CM

## 2024-05-13 DIAGNOSIS — M06.9 RHEUMATOID ARTHRITIS, INVOLVING UNSPECIFIED SITE, UNSPECIFIED WHETHER RHEUMATOID FACTOR PRESENT (MULTI): Primary | ICD-10-CM

## 2024-05-13 DIAGNOSIS — M19.90 OSTEOARTHRITIS, UNSPECIFIED OSTEOARTHRITIS TYPE, UNSPECIFIED SITE: ICD-10-CM

## 2024-05-13 LAB
25(OH)D3 SERPL-MCNC: 50 NG/ML (ref 30–100)
ANION GAP SERPL CALC-SCNC: 14 MMOL/L (ref 10–20)
BUN SERPL-MCNC: 20 MG/DL (ref 6–23)
CA-I BLD-SCNC: 1.25 MMOL/L (ref 1.1–1.33)
CALCIUM SERPL-MCNC: 9.6 MG/DL (ref 8.6–10.6)
CHLORIDE SERPL-SCNC: 102 MMOL/L (ref 98–107)
CO2 SERPL-SCNC: 31 MMOL/L (ref 21–32)
CREAT SERPL-MCNC: 0.84 MG/DL (ref 0.5–1.05)
EGFRCR SERPLBLD CKD-EPI 2021: 73 ML/MIN/1.73M*2
GLUCOSE SERPL-MCNC: 114 MG/DL (ref 74–99)
POTASSIUM SERPL-SCNC: 4.5 MMOL/L (ref 3.5–5.3)
SODIUM SERPL-SCNC: 142 MMOL/L (ref 136–145)

## 2024-05-13 PROCEDURE — 36415 COLL VENOUS BLD VENIPUNCTURE: CPT

## 2024-05-13 PROCEDURE — 99214 OFFICE O/P EST MOD 30 MIN: CPT | Performed by: INTERNAL MEDICINE

## 2024-05-13 PROCEDURE — 3074F SYST BP LT 130 MM HG: CPT | Performed by: INTERNAL MEDICINE

## 2024-05-13 PROCEDURE — 1159F MED LIST DOCD IN RCRD: CPT | Performed by: INTERNAL MEDICINE

## 2024-05-13 PROCEDURE — 80048 BASIC METABOLIC PNL TOTAL CA: CPT

## 2024-05-13 PROCEDURE — 82330 ASSAY OF CALCIUM: CPT

## 2024-05-13 PROCEDURE — 3078F DIAST BP <80 MM HG: CPT | Performed by: INTERNAL MEDICINE

## 2024-05-13 PROCEDURE — 82306 VITAMIN D 25 HYDROXY: CPT

## 2024-05-13 NOTE — PROGRESS NOTES
Recheck  OA  /  RA  /  OP  ( Due for Prolia - Tript )  doing well     HPI - she had one day with hand pain when it was raining, but overall, her joints are ok.  R knee sometimes swells.   Pain mgmt drained and injected it last mo.    No recent CP.  Usual SOB.  Sees cardiol.  No change in GI.      PE  NAD  RRR no r/m/g  CTA  No edema  No synovitis  NT and no pain with ROM throughout  Hyperkeratotic and scaly irreg patch RLE - pt states non-healing    A/P - OA and RA, stable on low-dose pred (unable to marcelina mult meds)  OP - on prolia.  She still hasn't repeated her DEXA  Check labs  Derm eval for leg lesions  Reeval 6 mo or sooner PRN

## 2024-05-29 PROCEDURE — RXMED WILLOW AMBULATORY MEDICATION CHARGE

## 2024-05-30 PROCEDURE — RXMED WILLOW AMBULATORY MEDICATION CHARGE

## 2024-05-31 ENCOUNTER — PHARMACY VISIT (OUTPATIENT)
Dept: PHARMACY | Facility: CLINIC | Age: 74
End: 2024-05-31
Payer: COMMERCIAL

## 2024-05-31 PROCEDURE — RXOTC WILLOW AMBULATORY OTC CHARGE

## 2024-06-03 DIAGNOSIS — M06.9 RHEUMATOID ARTHRITIS, INVOLVING UNSPECIFIED SITE, UNSPECIFIED WHETHER RHEUMATOID FACTOR PRESENT (MULTI): ICD-10-CM

## 2024-06-03 RX ORDER — PREDNISONE 1 MG/1
3 TABLET ORAL DAILY
Qty: 90 TABLET | Refills: 1 | Status: SHIPPED | OUTPATIENT
Start: 2024-06-03

## 2024-06-05 ENCOUNTER — INFUSION (OUTPATIENT)
Dept: INFUSION THERAPY | Facility: CLINIC | Age: 74
End: 2024-06-05
Payer: MEDICARE

## 2024-06-05 VITALS
OXYGEN SATURATION: 97 % | RESPIRATION RATE: 18 BRPM | WEIGHT: 134.8 LBS | SYSTOLIC BLOOD PRESSURE: 152 MMHG | HEART RATE: 69 BPM | TEMPERATURE: 98.1 F | BODY MASS INDEX: 27.14 KG/M2 | DIASTOLIC BLOOD PRESSURE: 58 MMHG

## 2024-06-05 DIAGNOSIS — M81.0 OSTEOPOROSIS, UNSPECIFIED OSTEOPOROSIS TYPE, UNSPECIFIED PATHOLOGICAL FRACTURE PRESENCE: ICD-10-CM

## 2024-06-05 PROCEDURE — 96372 THER/PROPH/DIAG INJ SC/IM: CPT | Performed by: NURSE PRACTITIONER

## 2024-06-05 RX ORDER — DIPHENHYDRAMINE HYDROCHLORIDE 50 MG/ML
50 INJECTION INTRAMUSCULAR; INTRAVENOUS AS NEEDED
OUTPATIENT
Start: 2024-12-02

## 2024-06-05 RX ORDER — ALBUTEROL SULFATE 0.83 MG/ML
3 SOLUTION RESPIRATORY (INHALATION) AS NEEDED
OUTPATIENT
Start: 2024-12-02

## 2024-06-05 RX ORDER — EPINEPHRINE 0.3 MG/.3ML
0.3 INJECTION SUBCUTANEOUS EVERY 5 MIN PRN
OUTPATIENT
Start: 2024-12-02

## 2024-06-05 RX ORDER — FAMOTIDINE 10 MG/ML
20 INJECTION INTRAVENOUS ONCE AS NEEDED
OUTPATIENT
Start: 2024-12-02

## 2024-06-05 ASSESSMENT — PAIN SCALES - GENERAL: PAINLEVEL: 6

## 2024-06-05 NOTE — PROGRESS NOTES
[facility]   Infusion Clinic Note   Date: 2024   Name: Sania Rosenberg  : 1950   MRN: [id number]         Reason for Visit: OP Infusion (Prolia)         Today: We administered denosumab.       Visit Type: INJECTION       Ordered By: Dr. Pham      Accompanied by:      Diagnosis: Osteoporosis, unspecified osteoporosis type, unspecified pathological fracture presence       Allergies:   Allergies as of 2024 - Reviewed 2024   Allergen Reaction Noted    Leflunomide Anaphylaxis and Swelling 2023    Penicillins Hives and Anaphylaxis 2023    Acetaminophen-codeine Swelling 2023    Albuterol Other, Headache, and Dizziness 2023    Clindamycin Rash and Swelling 2023    Ibuprofen-famotidine Rash and Swelling 2023    Lanolin Swelling 2023    Tramadol Nausea/vomiting, GI Upset, and Rash 2023    Calamine Unknown 2023    Calamine-zinc oxide Unknown 2023    Lactose Unknown 2023    Losartan potassium Other 2023    Cephalexin Itching, Swelling, and Rash 2023    Ciprofloxacin Rash 2023    Clarithromycin GI Upset 2023    Codeine Rash 2023    Levofloxacin Rash 2023    Lisinopril Rash, Other, and GI Upset 2023    Methotrexate GI Upset and Rash 2023    Neomycin-bacitracin-polymyxin Rash 2023    Nicotine Itching 2023    Other Rash 2023    Oxycodone-acetaminophen Rash 2023    Pregabalin Rash 2023    Propoxyphene n-acetaminophen Rash 2023    Sulfa (sulfonamide antibiotics) Rash 2023    Sulfamethoxazole-trimethoprim Rash 2023    Tetracycline Rash 2023         Current Medications has a current medication list which includes the following prescription(s): accu-chek nadia plus test strp, albuterol, aspirin, accu-chek nadia plus test strp, bromfenac, cetirizine, cholecalciferol, cyanocobalamin, prolia, furosemide, gabapentin,  gabapentin, hydrocodone-acetaminophen, hydrocodone-acetaminophen, isosorbide mononitrate er, lidocaine, metoprolol tartrate, nitrostat, oxygen, oxymetazoline, peg 400-propylene glycol, prednisone, and anoro ellipta.       Vitals:   Vitals:    06/05/24 1351   BP: 152/58   Pulse: 69   Resp: 18   Temp: 36.7 °C (98.1 °F)   TempSrc: Temporal   SpO2: 97%   Weight: 61.1 kg (134 lb 12.8 oz)   PainSc:   6   PainLoc: Back  Comment: chronic             Infusion Pre-procedure Checklist:   - Allergies reviewed: yes   - Medications reviewed: yes       - Previous reaction to current treatment: no      Assess patient for the concerns below. Document provider notification as appropriate.  - Active or recent infection with/without current antibiotic use: no  - Recent or planned invasive dental work: no  - Recent or planned surgeries: no  - Recently received or plans to receive vaccinations: no  - Has treatment related toxicities: no  - Is pregnant:  n/a      Pain: 6   - Is the pain different from normal: no   - Is the pain tolerable: yes   - Is your Doctor aware:  n/a      Labs:  labs reviewed. Ca level  9.6 on 5/13/24 and creatinine clearance 55.203         Fall Risk Screening: Dodson Fall Risk  History of Falling, Immediate or Within 3 Months: Yes  Secondary Diagnosis: Yes  Ambulatory Aid: Crutches/cane/walker  Intravenous Therapy/Heparin Lock: No  Gait/Transferring: Normal/bedrest/immobile  Mental Status: Oriented to own ability  Dodson Fall Risk Score: 55       Review Of Systems:  Review of Systems   All other systems reviewed and are negative.        Infusion Readiness:   - Assessment Concerns Related to Infusion: No  - Provider notified: n/a      Document Below Only If Indicated:   New Patient Education:    N/A (returning patient for continuation of therapy. Ongoing education provided as needed.)        Treatment Conditions & Drug Specific Questions:    Denosumab  (PROLIA. XGEVA)    (Unless otherwise specified on patient specific  therapy plan):     TREATMENT CONDITIONS:  Unless otherwise specified on patient specific therapy plan HOLD and notify provider prior to proceeding with today's injection if patients:  o Calcium is LESS THAN 8.6 mg/dL OR  Ionized Calcium LESS THAN 1.1 mmol/L  o Recent or planned invasive dental procedure (within 4 weeks)    Lab Results   Component Value Date    CALCIUM 9.6 05/13/2024    PHOS 2.2 (L) 01/19/2019      Lab Results   Component Value Date    CAION 1.25 05/13/2024       Labs reviewed and patient meets treatment conditions? Yes    DRUG SPECIFIC QUESTIONS:  Is the patient taking calcium and vitamin D? Yes  (Recommended)    Pt Instructed on following risks: (1) hypocalcemia, (2) osteonecrosis of the jaw, (3) atypical femoral fractures, (4) serious infections, and (5) dermatologic reactions?  Yes      REMINDER:  PREGNANCY CATEGORY X DRUG. OBTAIN NEGTATIVE PREGNANCY TEST PRIOR TO FIRST INFUSION FOR WOMEN OF CHILDBEARING ABILITY   REMS DRUG    Recommended Vitals/Observation:  Vitals: Obtain vitals prior to injection.  Observation: Patient may leave immediately following injection.        Weight Based Drug Calculations:    WEIGHT BASED DRUGS: NOT APPLICABLE / FLAT DOSE          Initiated By: Inocente Cunningham RN

## 2024-06-29 ENCOUNTER — PHARMACY VISIT (OUTPATIENT)
Dept: PHARMACY | Facility: CLINIC | Age: 74
End: 2024-06-29
Payer: COMMERCIAL

## 2024-06-29 PROCEDURE — RXMED WILLOW AMBULATORY MEDICATION CHARGE

## 2024-07-03 ENCOUNTER — PHARMACY VISIT (OUTPATIENT)
Dept: PHARMACY | Facility: CLINIC | Age: 74
End: 2024-07-03
Payer: COMMERCIAL

## 2024-07-26 PROCEDURE — RXMED WILLOW AMBULATORY MEDICATION CHARGE

## 2024-07-29 ENCOUNTER — PHARMACY VISIT (OUTPATIENT)
Dept: PHARMACY | Facility: CLINIC | Age: 74
End: 2024-07-29
Payer: COMMERCIAL

## 2024-07-29 PROCEDURE — RXMED WILLOW AMBULATORY MEDICATION CHARGE

## 2024-07-29 PROCEDURE — RXOTC WILLOW AMBULATORY OTC CHARGE

## 2024-07-30 DIAGNOSIS — M06.9 RHEUMATOID ARTHRITIS, INVOLVING UNSPECIFIED SITE, UNSPECIFIED WHETHER RHEUMATOID FACTOR PRESENT (MULTI): ICD-10-CM

## 2024-07-30 RX ORDER — PREDNISONE 1 MG/1
3 TABLET ORAL DAILY
Qty: 270 TABLET | Refills: 0 | Status: SHIPPED | OUTPATIENT
Start: 2024-07-30

## 2024-08-02 ENCOUNTER — HOSPITAL ENCOUNTER (OUTPATIENT)
Dept: RADIOLOGY | Facility: CLINIC | Age: 74
Discharge: HOME | End: 2024-08-02
Payer: MEDICARE

## 2024-08-02 DIAGNOSIS — M48.07 SPINAL STENOSIS, LUMBOSACRAL REGION: ICD-10-CM

## 2024-08-02 DIAGNOSIS — M47.26 OTHER SPONDYLOSIS WITH RADICULOPATHY, LUMBAR REGION: ICD-10-CM

## 2024-08-02 DIAGNOSIS — M51.36 OTHER INTERVERTEBRAL DISC DEGENERATION, LUMBAR REGION: ICD-10-CM

## 2024-08-02 DIAGNOSIS — M51.26 OTHER INTERVERTEBRAL DISC DISPLACEMENT, LUMBAR REGION: ICD-10-CM

## 2024-08-02 PROCEDURE — 72148 MRI LUMBAR SPINE W/O DYE: CPT

## 2024-09-03 ENCOUNTER — HOSPITAL ENCOUNTER (OUTPATIENT)
Dept: RADIOLOGY | Facility: HOSPITAL | Age: 74
Discharge: HOME | End: 2024-09-03
Payer: MEDICARE

## 2024-09-03 DIAGNOSIS — Z85.118 PERSONAL HISTORY OF OTHER MALIGNANT NEOPLASM OF BRONCHUS AND LUNG: ICD-10-CM

## 2024-09-03 PROCEDURE — 71250 CT THORAX DX C-: CPT

## 2024-09-03 PROCEDURE — 71250 CT THORAX DX C-: CPT | Performed by: RADIOLOGY

## 2024-10-14 ENCOUNTER — APPOINTMENT (OUTPATIENT)
Dept: OTOLARYNGOLOGY | Facility: CLINIC | Age: 74
End: 2024-10-14
Payer: MEDICARE

## 2024-10-14 ENCOUNTER — APPOINTMENT (OUTPATIENT)
Dept: AUDIOLOGY | Facility: CLINIC | Age: 74
End: 2024-10-14
Payer: MEDICARE

## 2024-10-14 VITALS — BODY MASS INDEX: 25.52 KG/M2 | HEIGHT: 60 IN | WEIGHT: 130 LBS

## 2024-10-14 DIAGNOSIS — H91.8X3 OTHER SPECIFIED HEARING LOSS, BILATERAL: ICD-10-CM

## 2024-10-14 DIAGNOSIS — E04.1 THYROID NODULE: ICD-10-CM

## 2024-10-14 DIAGNOSIS — H61.23 BILATERAL IMPACTED CERUMEN: Primary | ICD-10-CM

## 2024-10-14 DIAGNOSIS — E04.1 LEFT THYROID NODULE: ICD-10-CM

## 2024-10-14 PROCEDURE — 1159F MED LIST DOCD IN RCRD: CPT | Performed by: OTOLARYNGOLOGY

## 2024-10-14 PROCEDURE — 99203 OFFICE O/P NEW LOW 30 MIN: CPT | Performed by: OTOLARYNGOLOGY

## 2024-10-14 PROCEDURE — 69210 REMOVE IMPACTED EAR WAX UNI: CPT | Performed by: OTOLARYNGOLOGY

## 2024-10-14 NOTE — PROGRESS NOTES
Unable to perform hearing evaluation due to excessive cerumen.  Dr. Keys was able to clean the patient's ears, and the patient will reschedule the hearing evaluation.

## 2024-10-14 NOTE — PROGRESS NOTES
Chief Complaint   Patient presents with    New Patient Visit     NP-THYROID CK/ DIFFICULTY HEARING/ NO AUDIO DONE     HPI:  Sania Rosenberg is a 74 y.o. female who presents today with 2-year history complaints of asymmetric left-sided hearing loss.  No significant pressure, pain, tinnitus.  Audiogram was scheduled today but needed to be postponed secondary to bilateral wax impactions.  In addition to this she is found on CT scan of the chest to have dominant left-sided thyroid nodule.  Prior CT scans of the chest dating back to  reveal nodular thyroid gland.  History of right lung cancer status post resection in 2017.    PMH:  History reviewed. No pertinent past medical history.  Past Surgical History:   Procedure Laterality Date    APPENDECTOMY  2015    Appendectomy    CARDIAC CATHETERIZATION N/A 3/18/2024    Procedure: Left Heart Cath, No LV;  Surgeon: Jerad Doherty MD;  Location: [facility] Cardiac Cath Lab;  Service: Cardiovascular;  Laterality: N/A;  no auth required     SECTION, CLASSIC  2015     Section    CHOLECYSTECTOMY  2015    Cholecystectomy    CT GUIDED PERCUTANEOUS PERITONEAL OR RETROPERITONEAL FLUID COLLECTION DRAINAGE  2019    CT GUIDED PERCUTANEOUS PERITONEAL OR RETROPERITONEAL FLUID COLLECTION DRAINAGE [facility]    TONSILLECTOMY  2015    Tonsillectomy         Medications:     Current Outpatient Medications:     Accu-Chek Aicha Plus test strp strip, 100 Strips once daily. Use as instructed to test blood sugars once daily., Disp: , Rfl:     albuterol 90 mcg/actuation inhaler, Inhale 2 puffs every 6 hours if needed., Disp: , Rfl:     aspirin 81 mg chewable tablet, Chew 1 tablet (81 mg) once daily., Disp: , Rfl:     blood sugar diagnostic (Accu-Chek Aicha Plus test strp) strip, use as directed once daily., Disp: , Rfl:     bromfenac (Xibrom) 0.09 % ophthalmic solution, Administer 1 drop into both eyes 2 times a day., Disp: , Rfl:     cetirizine  (ZyrTEC) 10 mg capsule, Take 1 capsule (10 mg) by mouth 2 times a day., Disp: , Rfl:     cholecalciferol (Vitamin D-3) 25 MCG (1000 UT) tablet, Take 1 tablet (25 mcg) by mouth once daily., Disp: , Rfl:     cyanocobalamin (Vitamin B-12) 1,000 mcg/mL injection, Inject 1 mL (1,000 mcg) into the muscle every 30 (thirty) days., Disp: , Rfl:     denosumab (Prolia) 60 mg/mL syringe, Inject 1 mL (60 mg) under the skin every 6 months., Disp: , Rfl:     furosemide (Lasix) 20 mg tablet, Take 1 tablet (20 mg) by mouth every other day., Disp: , Rfl:     gabapentin (Neurontin) 400 mg capsule, Take 1 capsule (400 mg) by mouth 2 times a day., Disp: , Rfl:     gabapentin (Neurontin) 400 mg capsule, Take 1 capsule by mouth twice daily, Disp: 60 capsule, Rfl: 3    HYDROcodone-acetaminophen (Norco) 5-325 mg tablet, Take 1 tablet by mouth every 6 hours if needed., Disp: , Rfl:     HYDROcodone-acetaminophen (Norco) 5-325 mg tablet, Take 1 tablet by mouth every 8 hours as needed, Disp: 90 tablet, Rfl: 0    isosorbide mononitrate ER (Imdur) 30 mg 24 hr tablet, Take 1 tablet (30 mg) by mouth once daily., Disp: , Rfl:     lidocaine 4 % patch, Place 1 patch on the skin once daily., Disp: , Rfl:     metoprolol tartrate (Lopressor) 25 mg tablet, Take 1 tablet (25 mg) by mouth 2 times a day., Disp: , Rfl:     Nitrostat 0.4 mg SL tablet, Place 1 tablet (0.4 mg) under the tongue every 5 minutes if needed., Disp: , Rfl:     oxygen (O2) gas therapy, once daily at bedtime., Disp: , Rfl:     oxymetazoline 0.05 % nasal spray, Administer 1 spray into affected nostril(s) twice a day., Disp: , Rfl:     peg 400-propylene glycol (SYSTANE) 0.4-0.3 % drops ophthalmic drops, Administer 2 drops into both eyes 2 times a day as needed., Disp: , Rfl:     predniSONE (Deltasone) 1 mg tablet, Take 3 tablets (3 mg) by mouth once daily., Disp: 270 tablet, Rfl: 0    umeclidinium-vilanteroL (Anoro Ellipta) 62.5-25 mcg/actuation blister with device, Inhale 1 puff once  "daily., Disp: , Rfl:      Allergies:  Allergies   Allergen Reactions    Leflunomide Anaphylaxis and Swelling    Penicillins Hives and Anaphylaxis    Acetaminophen-Codeine Swelling    Albuterol Other, Headache and Dizziness     TREMORS    Clindamycin Rash and Swelling    Ibuprofen-Famotidine Rash and Swelling    Lanolin Swelling    Tramadol Nausea/vomiting, GI Upset and Rash    Calamine Unknown    Calamine-Zinc Oxide Unknown    Lactose Unknown    Losartan Potassium Other     \"high heart rate    Cephalexin Itching, Swelling and Rash    Ciprofloxacin Rash    Clarithromycin GI Upset    Codeine Rash    Levofloxacin Rash    Lisinopril Rash, Other and GI Upset     numbness    Methotrexate GI Upset and Rash    Neomycin-Bacitracin-Polymyxin Rash    Nicotine Itching    Other Rash     Adhesive Nicoderm Smoking Patch    Oxycodone-Acetaminophen Rash    Pregabalin Rash    Propoxyphene N-Acetaminophen Rash    Sulfa (Sulfonamide Antibiotics) Rash    Sulfamethoxazole-Trimethoprim Rash    Tetracycline Rash        ROS:  Review of systems normal unless stated otherwise in the HPI and/or PMH.    Physical Exam:  Height 1.524 m (5'), weight 59 kg (130 lb). Body mass index is 25.39 kg/m².     GENERAL APPEARANCE: Well developed and well nourished.  Alert and oriented in no acute distress.  Normal vocal quality.      HEAD/FACE: No erythema or edema or facial tenderness.  Normal facial nerve function bilaterally.    EAR:       EXTERNAL: Normal pinnas and bilateral obstructive cerumen impaction was removed by myself with instrumentation using the operating microscope.  Normal pinnas and external auditory canals after cleaning.       MIDDLE EAR: Tympanic membranes intact and mobile with normal landmarks.  Middle ear space appears well aerated.       TUBE STATUS: N/A       MASTOID CAVITY: N/A       HEARING: Gross hearing assessment is within normal limits.      NOSE:       VISUALIZED USING: Anterior rhinoscopy with headlight and nasal " speculum.       DORSUM: Midline, nontraumatic appearance.       MUCOSA: Normal-appearing.       SECRETIONS: Normal.       SEPTUM: Midline and nonobstructing.       INFERIOR TURBINATES: Normal.       MIDDLE TURBINATES/MEATUS: N/A       BLEEDING: N/A         ORAL CAVITY/PHARYNX:       TEETH: Adequate dentition.       TONGUE: No mass or lesion.  Normal mobility.       FLOOR OF MOUTH: No mass or lesion.       PALATE: Normal hard palate, soft palate, and uvula.       OROPHARYNX: Normal without mass or lesion.  Tonsils absent       BUCCAL MUCOSA/GBS: Normal without mass or lesion.       LIPS: Normal.    LARYNX/HYPOPHARYNX/NASOPHARYNX: N/A    NECK: No palpable masses or abnormal adenopathy.  Trachea is midline.    THYROID: Fullness and enlargement left lobe.    SALIVARY GLANDS: Normal bilateral parotid and submandibular glands by inspection and palpation.    TMJ's: Normal.    NEURO: Cranial nerve exam grossly normal bilaterally.       Assessment/Plan   Sania was seen today for new patient visit.  Diagnoses and all orders for this visit:  Bilateral impacted cerumen (Primary)  Other specified hearing loss, bilateral  Left thyroid nodule  Thyroid nodule  -     US thyroid; Future     Both ears cleaned of impacted wax.  Will reschedule her audiogram for her complaints of some chronic left-sided asymmetric hearing loss.  Has dominant left-sided thyroid nodule in a background of multinodular goiter with dominant nodules dating back to 2013.  Will check baseline thyroid ultrasound.  Follow up for audiogram, test results after testing is complete.     Negrito Keys MD

## 2024-10-17 ENCOUNTER — HOSPITAL ENCOUNTER (OUTPATIENT)
Dept: RADIOLOGY | Facility: HOSPITAL | Age: 74
Discharge: HOME | End: 2024-10-17
Payer: MEDICARE

## 2024-10-17 DIAGNOSIS — E04.1 THYROID NODULE: ICD-10-CM

## 2024-10-17 PROCEDURE — 76536 US EXAM OF HEAD AND NECK: CPT | Performed by: RADIOLOGY

## 2024-10-17 PROCEDURE — 76536 US EXAM OF HEAD AND NECK: CPT

## 2024-10-28 DIAGNOSIS — M06.9 RHEUMATOID ARTHRITIS, INVOLVING UNSPECIFIED SITE, UNSPECIFIED WHETHER RHEUMATOID FACTOR PRESENT (MULTI): ICD-10-CM

## 2024-10-28 RX ORDER — PREDNISONE 1 MG/1
3 TABLET ORAL DAILY
Qty: 270 TABLET | Refills: 0 | Status: SHIPPED | OUTPATIENT
Start: 2024-10-28

## 2024-11-11 ENCOUNTER — APPOINTMENT (OUTPATIENT)
Dept: AUDIOLOGY | Facility: CLINIC | Age: 74
End: 2024-11-11
Payer: MEDICARE

## 2024-11-11 ENCOUNTER — APPOINTMENT (OUTPATIENT)
Dept: OTOLARYNGOLOGY | Facility: CLINIC | Age: 74
End: 2024-11-11
Payer: MEDICARE

## 2024-11-11 VITALS — TEMPERATURE: 97.2 F | WEIGHT: 135 LBS | BODY MASS INDEX: 26.5 KG/M2 | HEIGHT: 60 IN

## 2024-11-11 DIAGNOSIS — H90.3 BILATERAL SENSORINEURAL HEARING LOSS: Primary | ICD-10-CM

## 2024-11-11 DIAGNOSIS — H93.12 TINNITUS OF LEFT EAR: Primary | ICD-10-CM

## 2024-11-11 DIAGNOSIS — E04.1 LEFT THYROID NODULE: ICD-10-CM

## 2024-11-11 DIAGNOSIS — E04.2 NONTOXIC MULTINODULAR GOITER: ICD-10-CM

## 2024-11-11 DIAGNOSIS — E04.1 RIGHT THYROID NODULE: ICD-10-CM

## 2024-11-11 DIAGNOSIS — H90.3 SENSORINEURAL HEARING LOSS (SNHL) OF BOTH EARS: ICD-10-CM

## 2024-11-11 PROCEDURE — 1159F MED LIST DOCD IN RCRD: CPT | Performed by: OTOLARYNGOLOGY

## 2024-11-11 PROCEDURE — 99214 OFFICE O/P EST MOD 30 MIN: CPT | Performed by: OTOLARYNGOLOGY

## 2024-11-11 PROCEDURE — 92557 COMPREHENSIVE HEARING TEST: CPT | Performed by: AUDIOLOGIST

## 2024-11-11 PROCEDURE — 92550 TYMPANOMETRY & REFLEX THRESH: CPT | Performed by: AUDIOLOGIST

## 2024-11-11 PROCEDURE — 1160F RVW MEDS BY RX/DR IN RCRD: CPT | Performed by: OTOLARYNGOLOGY

## 2024-11-11 RX ORDER — SIMVASTATIN 10 MG/1
1 TABLET, FILM COATED ORAL NIGHTLY
COMMUNITY
Start: 2024-10-24 | End: 2025-01-22

## 2024-11-11 NOTE — PROGRESS NOTES
Chief Complaint   Patient presents with    Follow-up     LOV: 10/2024 HEARING & THYROID CK, HAD US AND AUDIO DONE     HPI:  Sania Rosenberg is a 74 y.o. female who presents today with 2-year history complaints of asymmetric left-sided hearing loss.  No significant pressure, pain, tinnitus.    Audiogram was performed today which does show bilateral high-frequency sensorineural hearing loss.  In addition to this she is found on CT scan of the chest to have dominant left-sided thyroid nodule.  Prior CT scans of the chest dating back to 2013 reveal nodular thyroid gland.  She did have ultrasound of this performed on October 17 which shows bilateral dominant and 1 isthmus nodule.  History of right lung cancer status post resection in 2017.  --  US THYROID;  10/17/2024       FINDINGS:      RIGHT LOBE:  The right lobe of the thyroid measures 1.7 cm x 2.0 cm x 5.5 cm. The  right lobe of the thyroid is homogeneous in echotexture and  demonstrates nodules described below. Cystic or almost completely  cystic nodule measuring up to 7 mm on image 19, categorized as a TR 1  nodule. There is a solid hypoechoic wider than tall nodule with  somewhat lobulated margins and without internal echogenic foci,  measuring up to 1.2 cm. This is categorized as a TIRADS category 4  nodule.          LEFT LOBE:  The left lobe of the thyroid measures 2.1 cm x 2.6 cm x 5.1 cm. The  left lobe of the thyroid is homogeneous in echotexture and  demonstrates nodule described below.      Hypoechoic wider than tall nodule with ill-defined margins and  without definite internal echogenic foci, measuring up to 3.2 cm,  categorized as a TIRADS category 4 nodule. (Image 49).      ISTHMUS:  The isthmus measures approximately 0.5 cm and is homogeneous in  echotexture and demonstrates a 1.5 cm TIRADS category 4 nodule..      CERVICAL LYMPH NODES:  No cervical lymphadenopathy is present.      IMPRESSION:  1. In the left thyroid lobe, there is a 3.2 cm TIRADS  category 4  nodule. Based on the TIRADS criteria, fine-needle aspiration is  suggested for this.  2. Additional TIRADS category 4 nodules measuring up to 1.5 cm in the  right thyroid lobe and isthmus for which follow-up is recommended in  1, 2, 3, and 5 years..  --  PMH:  History reviewed. No pertinent past medical history.  Past Surgical History:   Procedure Laterality Date    APPENDECTOMY  2015    Appendectomy    CARDIAC CATHETERIZATION N/A 3/18/2024    Procedure: Left Heart Cath, No LV;  Surgeon: Jerad Doherty MD;  Location: [facility] Cardiac Cath Lab;  Service: Cardiovascular;  Laterality: N/A;  no auth required     SECTION, CLASSIC  2015     Section    CHOLECYSTECTOMY  2015    Cholecystectomy    CT GUIDED PERCUTANEOUS PERITONEAL OR RETROPERITONEAL FLUID COLLECTION DRAINAGE  2019    CT GUIDED PERCUTANEOUS PERITONEAL OR RETROPERITONEAL FLUID COLLECTION DRAINAGE [facility]    TONSILLECTOMY  2015    Tonsillectomy         Medications:     Current Outpatient Medications:     Accu-Chek Aicha Plus test strp strip, 100 Strips once daily. Use as instructed to test blood sugars once daily., Disp: , Rfl:     albuterol 90 mcg/actuation inhaler, Inhale 2 puffs every 6 hours if needed., Disp: , Rfl:     aspirin 81 mg chewable tablet, Chew 1 tablet (81 mg) once daily., Disp: , Rfl:     blood sugar diagnostic (Accu-Chek Aicha Plus test strp) strip, use as directed once daily., Disp: , Rfl:     bromfenac (Xibrom) 0.09 % ophthalmic solution, Administer 1 drop into both eyes 2 times a day., Disp: , Rfl:     cetirizine (ZyrTEC) 10 mg capsule, Take 1 capsule (10 mg) by mouth 2 times a day., Disp: , Rfl:     cholecalciferol (Vitamin D-3) 25 MCG (1000 UT) tablet, Take 1 tablet (25 mcg) by mouth once daily., Disp: , Rfl:     denosumab (Prolia) 60 mg/mL syringe, Inject 1 mL (60 mg) under the skin every 6 months., Disp: , Rfl:     furosemide (Lasix) 20 mg tablet, Take 1 tablet (20  mg) by mouth every other day., Disp: , Rfl:     gabapentin (Neurontin) 400 mg capsule, Take 1 capsule (400 mg) by mouth 2 times a day., Disp: , Rfl:     gabapentin (Neurontin) 400 mg capsule, Take 1 capsule by mouth twice daily, Disp: 60 capsule, Rfl: 3    HYDROcodone-acetaminophen (Norco) 5-325 mg tablet, Take 1 tablet by mouth every 6 hours if needed., Disp: , Rfl:     HYDROcodone-acetaminophen (Norco) 5-325 mg tablet, Take 1 tablet by mouth every 8 hours as needed, Disp: 90 tablet, Rfl: 0    isosorbide mononitrate ER (Imdur) 30 mg 24 hr tablet, Take 1 tablet (30 mg) by mouth once daily., Disp: , Rfl:     lidocaine 4 % patch, Place 1 patch on the skin once daily., Disp: , Rfl:     metoprolol tartrate (Lopressor) 25 mg tablet, Take 1 tablet (25 mg) by mouth 2 times a day., Disp: , Rfl:     Nitrostat 0.4 mg SL tablet, Place 1 tablet (0.4 mg) under the tongue every 5 minutes if needed., Disp: , Rfl:     oxygen (O2) gas therapy, once daily at bedtime., Disp: , Rfl:     oxymetazoline 0.05 % nasal spray, Administer 1 spray into affected nostril(s) twice a day., Disp: , Rfl:     peg 400-propylene glycol (SYSTANE) 0.4-0.3 % drops ophthalmic drops, Administer 2 drops into both eyes 2 times a day as needed., Disp: , Rfl:     predniSONE (Deltasone) 1 mg tablet, Take 3 tablets (3 mg) by mouth once daily., Disp: 270 tablet, Rfl: 0    simvastatin (Zocor) 10 mg tablet, Take 1 tablet (10 mg) by mouth once daily at bedtime., Disp: , Rfl:     umeclidinium-vilanteroL (Anoro Ellipta) 62.5-25 mcg/actuation blister with device, Inhale 1 puff once daily., Disp: , Rfl:     cyanocobalamin (Vitamin B-12) 1,000 mcg/mL injection, Inject 1 mL (1,000 mcg) into the muscle every 30 (thirty) days., Disp: , Rfl:      Allergies:  Allergies   Allergen Reactions    Leflunomide Anaphylaxis and Swelling    Penicillins Hives and Anaphylaxis    Acetaminophen-Codeine Swelling    Albuterol Other, Headache and Dizziness     TREMORS    Clindamycin Rash and  "Swelling    Ibuprofen-Famotidine Rash and Swelling    Lanolin Swelling    Tramadol Nausea/vomiting, GI Upset and Rash    Calamine Unknown    Calamine-Zinc Oxide Unknown    Lactose Unknown    Losartan Potassium Other     \"high heart rate    Cephalexin Itching, Swelling and Rash    Ciprofloxacin Rash    Clarithromycin GI Upset    Codeine Rash    Levofloxacin Rash    Lisinopril Rash, Other and GI Upset     numbness    Methotrexate GI Upset and Rash    Neomycin-Bacitracin-Polymyxin Rash    Nicotine Itching    Other Rash     Adhesive Nicoderm Smoking Patch    Oxycodone-Acetaminophen Rash    Pregabalin Rash    Propoxyphene N-Acetaminophen Rash    Sulfa (Sulfonamide Antibiotics) Rash    Sulfamethoxazole-Trimethoprim Rash    Tetracycline Rash        ROS:  Review of systems normal unless stated otherwise in the HPI and/or PMH.    Physical Exam:  Temperature 36.2 °C (97.2 °F), height 1.524 m (5'), weight 61.2 kg (135 lb). Body mass index is 26.37 kg/m².     GENERAL APPEARANCE: Well developed and well nourished.  Alert and oriented in no acute distress.  Normal vocal quality.      HEAD/FACE: No erythema or edema or facial tenderness.  Normal facial nerve function bilaterally.    EAR:       EXTERNAL: Normal pinnas and external canals.       MIDDLE EAR: Tympanic membranes intact and mobile with normal landmarks.  Middle ear space appears well aerated.       TUBE STATUS: N/A       MASTOID CAVITY: N/A       HEARING: Gross hearing assessment is within normal limits.      NOSE:       VISUALIZED USING: Anterior rhinoscopy with headlight and nasal speculum.       DORSUM: Midline, nontraumatic appearance.       MUCOSA: Normal-appearing.       SECRETIONS: Normal.       SEPTUM: Midline and nonobstructing.       INFERIOR TURBINATES: Normal.       MIDDLE TURBINATES/MEATUS: N/A       BLEEDING: N/A         ORAL CAVITY/PHARYNX:       TEETH: Adequate dentition.       TONGUE: No mass or lesion.  Normal mobility.       FLOOR OF MOUTH: No mass or " lesion.       PALATE: Normal hard palate, soft palate, and uvula.       OROPHARYNX: Normal without mass or lesion.  Tonsils absent       BUCCAL MUCOSA/GBS: Normal without mass or lesion.       LIPS: Normal.    LARYNX/HYPOPHARYNX/NASOPHARYNX: N/A    NECK: No palpable masses or abnormal adenopathy.  Trachea is midline.    THYROID: Fullness and enlargement left lobe.    SALIVARY GLANDS: Normal bilateral parotid and submandibular glands by inspection and palpation.    TMJ's: Normal.    NEURO: Cranial nerve exam grossly normal bilaterally.       Assessment/Plan   Sania was seen today for follow-up.  Diagnoses and all orders for this visit:  Bilateral sensorineural hearing loss (Primary)  Nontoxic multinodular goiter  Left thyroid nodule  Right thyroid nodule    Educate about her hearing loss.  She is interested in scheduling appoint with the audiologist to discuss hearing aids.  Has 3 dominant thyroid nodules.  Will arrange for ultrasound-guided needle biopsies in the office.  Follow up thyroid bx.     Negrito Keys MD

## 2024-11-11 NOTE — PROGRESS NOTES
AUDIOLOGY ADULT AUDIOMETRIC EVALUATION    Name:  Sania Rosenberg  :  1950  Age:  74 y.o.  Date of Evaluation:  2024    Reason for visit: Ms. Rosenberg is seen in the clinic today at the request of Negrito Keys MD in otolaryngology for an audiologic evaluation.     HISTORY  The patient reported a two year history of hearing loss (left ear worse than right ear).  Intermittent left-sided tinnitus was also reported.     EVALUATION  See scanned audiogram: “Media” > “Audiology Report”.      RESULTS  Otoscopic Evaluation:  Right Ear: clear ear canal  Left Ear: clear ear canal    Immittance Measures:  Tympanometry:  Right Ear: Type A, normal tympanic membrane mobility with normal middle ear pressure   Left Ear: Type A, normal tympanic membrane mobility with normal middle ear pressure     Acoustic Reflexes:  Ipsilateral Right Ear: present at normal levels at 500-4000 Hz   Ipsilateral Left Ear: present at normal levels at 500-4000 Hz   Contralateral Right Ear: did not evaluate  Contralateral Left Ear: did not evaluate    Distortion Product Otoacoustic Emissions (DPOAEs):  Right Ear: absent at 3144-5562 Hz  Left Ear: absent at 5988-9918 Hz    Audiometry:  Test Technique and Reliability:   Standard audiometry via supra-aural headphones. Reliability is good.    Pure tone air and bone conduction audiometry:  Right Ear: mild sloping to moderately-severe sensorineural hearing loss at 750-8000 Hz  Left Ear: mild sloping to moderately-severe sensorineural hearing loss at 750-8000 Hz    Speech Audiometry (Word Recognition Scores):   Right Ear: Excellent, 96% in quiet at an elevated presentation level   Left Ear:  Excellent, 96% in quiet at an elevated presentation level     IMPRESSIONS  Results of today's audiometric evaluation revealed a bilateral sensorineural hearing loss.   The presence of acoustic reflexes within normal intensity limits is consistent with normal middle ear and brainstem function, and suggests  that auditory sensitivity is not significantly impaired. An elevated or absent acoustic reflex threshold is consistent with a middle ear disorder, hearing loss in the stimulated ear, and/or interruption of neural innervation of the stapedius muscle. Present DPOAEs suggest normal/near normal cochlear outer hair cell function and are consistent with no greater than a mild hearing loss at those frequencies. Absent DPOAEs are consistent with abnormal cochlear outer hair cell function at those frequencies.    RECOMMENDATIONS  - Follow up with otolaryngology today as scheduled.  - Annual audiologic evaluation, sooner if an acute change is noted.  - Consider hearing aids. Contact insurance to determine if there is an applicable benefit and where it can be used. Contact our office to schedule an appointment should she wish to proceed with hearing aids through our clinic.    PATIENT EDUCATION  Discussed results, impressions and recommendations with the patient. Questions were addressed and the patient was encouraged to contact our office should concerns arise.    Time for this encounter: 1:30-2:00    Gaby Elliott M.A., CCC-A   Licensed Audiologist

## 2024-11-12 ENCOUNTER — LAB (OUTPATIENT)
Dept: LAB | Facility: LAB | Age: 74
End: 2024-11-12
Payer: MEDICARE

## 2024-11-12 ENCOUNTER — OFFICE VISIT (OUTPATIENT)
Dept: RHEUMATOLOGY | Facility: CLINIC | Age: 74
End: 2024-11-12
Payer: MEDICARE

## 2024-11-12 VITALS — WEIGHT: 133.6 LBS | DIASTOLIC BLOOD PRESSURE: 66 MMHG | SYSTOLIC BLOOD PRESSURE: 136 MMHG | BODY MASS INDEX: 26.09 KG/M2

## 2024-11-12 DIAGNOSIS — M06.9 RHEUMATOID ARTHRITIS, INVOLVING UNSPECIFIED SITE, UNSPECIFIED WHETHER RHEUMATOID FACTOR PRESENT (MULTI): ICD-10-CM

## 2024-11-12 DIAGNOSIS — M81.0 OSTEOPOROSIS: ICD-10-CM

## 2024-11-12 DIAGNOSIS — M06.9 RHEUMATOID ARTHRITIS, INVOLVING UNSPECIFIED SITE, UNSPECIFIED WHETHER RHEUMATOID FACTOR PRESENT (MULTI): Primary | ICD-10-CM

## 2024-11-12 DIAGNOSIS — M81.0 OSTEOPOROSIS, POST-MENOPAUSAL: ICD-10-CM

## 2024-11-12 PROCEDURE — 3075F SYST BP GE 130 - 139MM HG: CPT | Performed by: INTERNAL MEDICINE

## 2024-11-12 PROCEDURE — 82330 ASSAY OF CALCIUM: CPT

## 2024-11-12 PROCEDURE — 82565 ASSAY OF CREATININE: CPT

## 2024-11-12 PROCEDURE — 99214 OFFICE O/P EST MOD 30 MIN: CPT | Performed by: INTERNAL MEDICINE

## 2024-11-12 PROCEDURE — 3078F DIAST BP <80 MM HG: CPT | Performed by: INTERNAL MEDICINE

## 2024-11-12 PROCEDURE — 82306 VITAMIN D 25 HYDROXY: CPT

## 2024-11-12 PROCEDURE — 36415 COLL VENOUS BLD VENIPUNCTURE: CPT

## 2024-11-12 NOTE — PROGRESS NOTES
Recheck  OA  /  RA  /  OP  ( Due Prolia - Dec  - Tript )  Increased achiness with weather changes    HPI - She has some incr hand pain with colder weather - when she keeps them warm, they feel better.  Her R leg swells - her pain mgmt drains it every 3mo.   She c/o back pain - pain mgmt is to inject her tomorrow.  AM stiffness ?duration.  She hasn't seen derm about the leg lesion but says she will.   She still hasn't had repeat DEXA.  Intermittent CP with stress - sees cardiol.  +SOB - sees pulm.  No GI    PE  NAD  RRR no r/m/g  CTA  1-2+ R>LLE edema  No synovitis    A/P - OA and RA, stable on low-dose prednisone with being unable to marcelina mult meds  OP on prolia - will reorder DEXA  She states she will see derm about her leg lesion  Follow up 6 mo or sooner PRN

## 2024-11-13 LAB
25(OH)D3 SERPL-MCNC: 64 NG/ML (ref 30–100)
CA-I BLD-SCNC: 1.17 MMOL/L (ref 1.1–1.33)
CREAT SERPL-MCNC: 0.88 MG/DL (ref 0.5–1.05)
EGFRCR SERPLBLD CKD-EPI 2021: 68 ML/MIN/1.73M*2

## 2024-11-21 DIAGNOSIS — M81.0 OSTEOPOROSIS, UNSPECIFIED OSTEOPOROSIS TYPE, UNSPECIFIED PATHOLOGICAL FRACTURE PRESENCE: Primary | ICD-10-CM

## 2024-12-02 ENCOUNTER — APPOINTMENT (OUTPATIENT)
Dept: AUDIOLOGY | Facility: CLINIC | Age: 74
End: 2024-12-02
Payer: MEDICARE

## 2024-12-10 ENCOUNTER — APPOINTMENT (OUTPATIENT)
Dept: INFUSION THERAPY | Facility: CLINIC | Age: 74
End: 2024-12-10
Payer: MEDICARE

## 2024-12-10 VITALS
RESPIRATION RATE: 18 BRPM | BODY MASS INDEX: 25.78 KG/M2 | WEIGHT: 132 LBS | HEART RATE: 73 BPM | DIASTOLIC BLOOD PRESSURE: 70 MMHG | TEMPERATURE: 97.8 F | OXYGEN SATURATION: 95 % | SYSTOLIC BLOOD PRESSURE: 170 MMHG

## 2024-12-10 DIAGNOSIS — M81.0 OSTEOPOROSIS, UNSPECIFIED OSTEOPOROSIS TYPE, UNSPECIFIED PATHOLOGICAL FRACTURE PRESENCE: ICD-10-CM

## 2024-12-10 PROCEDURE — 96372 THER/PROPH/DIAG INJ SC/IM: CPT | Performed by: NURSE PRACTITIONER

## 2024-12-10 RX ORDER — FAMOTIDINE 10 MG/ML
20 INJECTION INTRAVENOUS ONCE AS NEEDED
OUTPATIENT
Start: 2025-05-31

## 2024-12-10 RX ORDER — EPINEPHRINE 0.3 MG/.3ML
0.3 INJECTION SUBCUTANEOUS EVERY 5 MIN PRN
OUTPATIENT
Start: 2025-05-31

## 2024-12-10 RX ORDER — DIPHENHYDRAMINE HYDROCHLORIDE 50 MG/ML
50 INJECTION INTRAMUSCULAR; INTRAVENOUS AS NEEDED
OUTPATIENT
Start: 2025-05-31

## 2024-12-10 RX ORDER — ALBUTEROL SULFATE 0.83 MG/ML
3 SOLUTION RESPIRATORY (INHALATION) AS NEEDED
OUTPATIENT
Start: 2025-05-31

## 2024-12-10 ASSESSMENT — PAIN SCALES - GENERAL: PAINLEVEL_OUTOF10: 2

## 2024-12-10 ASSESSMENT — ENCOUNTER SYMPTOMS
LIGHT-HEADEDNESS: 0
EXTREMITY WEAKNESS: 1
WHEEZING: 0
SHORTNESS OF BREATH: 1
NUMBNESS: 0
WOUND: 0
COUGH: 0
PALPITATIONS: 0
LEG SWELLING: 1
DIZZINESS: 0

## 2024-12-10 NOTE — PATIENT INSTRUCTIONS
Today :We administered denosumab.     For:   1. Osteoporosis, unspecified osteoporosis type, unspecified pathological fracture presence         Your next appointment is due in:  6/11/2025        Please read the  Medication Guide that was given to you and reviewed during todays visit.     (Tell all doctors including dentists that you are taking this medication)     Go to the emergency room or call 911 if:  -You have signs of allergic reaction:   -Rash, hives, itching.   -Swollen, blistered, peeling skin.   -Swelling of face, lips, mouth, tongue or throat.   -Tightness of chest, trouble breathing, swallowing or talking     Call your doctor:  - If IV / injection site gets red, warm, swollen, itchy or leaks fluid or pus.     (Leave dressing on your IV site for at least 2 hours and keep area clean and dry  - If you get sick or have symptoms of infection or are not feeling well for any reason.    (Wash your hands often, stay away from people who are sick)  - If you have side effects from your medication that do not go away or are bothersome.     (Refer to the teaching your nurse gave you for side effects to call your doctor about)    - Common side effects may include:  stuffy nose, headache, feeling tired, muscle aches, upset stomach  - Before receiving any vaccines     - Call the [facility] at   If:  - You get sick, are on antibiotics, have had a recent vaccine, have surgery or dental work and your doctor wants your visit rescheduled.  - You need to cancel and reschedule your visit for any reason. Call at least 2 days before your visit if you need to cancel.   - Your insurance changes before your next visit.    (We will need to get approval from your new insurance. This can take up to two weeks.)     The [facility] is opened Monday thru Friday. We are closed on weekends and holidays.   Voice mail will take your call if the center is closed. If you leave a message please allow 24 hours  for a call back during weekdays. If you leave a message on a weekend/holiday, we will call you back the next business day.    A pharmacist is available Monday - Friday from 8:30AM to 3:30PM to help answer any questions you may have about your prescriptions(s). Please call pharmacy at:    [facility]: [phone]  [facility]: [phone]  [facility]: [phone]

## 2024-12-10 NOTE — PROGRESS NOTES
[facility]   Infusion Clinic Note   Date: December 10, 2024   Name: Sania Rosenbreg  : 1950   MRN: [id number]          Reason for Visit: OP Infusion (Prolia 60 mg every 180 days)         Today: We administered denosumab.       Visit Type: INFUSION       Ordered By: Dr. Pham       Accompanied by:       Diagnosis: Osteoporosis, unspecified osteoporosis type, unspecified pathological fracture presence        Allergies:   Allergies as of 12/10/2024 - Reviewed 12/10/2024   Allergen Reaction Noted    Leflunomide Anaphylaxis and Swelling 2023    Penicillins Hives and Anaphylaxis 2023    Acetaminophen-codeine Swelling 2023    Albuterol Other, Headache, and Dizziness 2023    Clindamycin Rash and Swelling 2023    Ibuprofen-famotidine Rash and Swelling 2023    Lanolin Swelling 2023    Tramadol Nausea/vomiting, GI Upset, and Rash 2023    Calamine Unknown 2023    Calamine-zinc oxide Unknown 2023    Lactose Unknown 2023    Losartan potassium Other 2023    Cephalexin Itching, Swelling, and Rash 2023    Ciprofloxacin Rash 2023    Clarithromycin GI Upset 2023    Codeine Rash 2023    Levofloxacin Rash 2023    Lisinopril Rash, Other, and GI Upset 2023    Methotrexate GI Upset and Rash 2023    Neomycin-bacitracin-polymyxin Rash 2023    Nicotine Itching 2023    Other Rash 2023    Oxycodone-acetaminophen Rash 2023    Pregabalin Rash 2023    Propoxyphene n-acetaminophen Rash 2023    Sulfa (sulfonamide antibiotics) Rash 2023    Sulfamethoxazole-trimethoprim Rash 2023    Tetracycline Rash 2023          Current Medications has a current medication list which includes the following prescription(s): accu-chek nadia plus test strp, albuterol, aspirin, accu-chek nadia plus test strp, bromfenac, cetirizine, cholecalciferol, prolia, furosemide,  gabapentin, gabapentin, hydrocodone-acetaminophen, isosorbide mononitrate er, lidocaine, metoprolol tartrate, nitrostat, oxygen, oxymetazoline, peg 400-propylene glycol, prednisone, simvastatin, and anoro ellipta.       Vitals:   Vitals:    12/10/24 1306   BP: 170/70   Pulse: 73   Resp: 18   Temp: 36.6 °C (97.8 °F)   TempSrc: Temporal   SpO2: 95%   Weight: 59.9 kg (132 lb)   PainSc:   2   PainLoc: Back             Infusion Pre-procedure Checklist:   - Allergies reviewed: yes   - Medications reviewed: yes       - Previous reaction to current treatment: no      Assess patient for the concerns below. Document provider notification as appropriate.  - Active or recent infection with/without current antibiotic use: no  - Recent or planned invasive dental work: no  - Recent or planned surgeries: no  - Recently received or plans to receive vaccinations: yes, Flu vaccine 10/31/2024  - Has treatment related toxicities: no  - Is pregnant:  n/a      Pain: 3   - Is the pain different from normal: no   - Is your Doctor aware:  yes       Labs:  reviewed results from 11/12/2024- See below          Fall Risk Screening: Dodson Fall Risk  History of Falling, Immediate or Within 3 Months: Yes  Secondary Diagnosis: Yes  Ambulatory Aid: Crutches/cane/walker  Intravenous Therapy/Heparin Lock: No  Gait/Transferring: Weak  Mental Status: Oriented to own ability  Dodson Fall Risk Score: 65       Review Of Systems:  Review of Systems   Respiratory:  Positive for shortness of breath. Negative for cough and wheezing.         (+) D.O.E   Cardiovascular:  Positive for leg swelling. Negative for chest pain and palpitations.        Chronic BLE non pitting edema  R > L   Musculoskeletal:  Positive for gait problem.   Skin:  Negative for itching, rash and wound.   Neurological:  Positive for extremity weakness and gait problem. Negative for dizziness, light-headedness and numbness.        Chronic RLE knee pain and neuropathy.  Patient ambulates with  cane- slow steady gait noted.         ROS completed? Yes      Infusion Readiness:  - Assessment Concerns Related to Infusion: No  - Provider notified: no      Document Below Only If Indicated:   New Patient Education:    N/A (returning patient for continuation of therapy. Ongoing education provided as needed.)        Treatment Conditions & Drug Specific Questions:    Denosumab  (PROLIA. XGEVA)    (Unless otherwise specified on patient specific therapy plan):     TREATMENT CONDITIONS:  Unless otherwise specified on patient specific therapy plan HOLD and notify provider prior to proceeding with today's injection if patients:  o Calcium is LESS THAN 8.6 mg/dL OR  Ionized Calcium LESS THAN 1.1 mmol/L  o Recent or planned invasive dental procedure (within 4 weeks)    Lab Results   Component Value Date    CALCIUM 9.6 05/13/2024    PHOS 2.2 (L) 01/19/2019      Lab Results   Component Value Date    CAION 1.17 11/12/2024       Labs reviewed and patient meets treatment conditions? Yes    DRUG SPECIFIC QUESTIONS:  Is the patient taking calcium and vitamin D? Yes  (Recommended)    Pt Instructed on following risks: (1) hypocalcemia, (2) osteonecrosis of the jaw, (3) atypical femoral fractures, (4) serious infections, and (5) dermatologic reactions?  Yes      REMINDER:  PREGNANCY CATEGORY X DRUG. OBTAIN NEGTATIVE PREGNANCY TEST PRIOR TO FIRST INFUSION FOR WOMEN OF CHILDBEARING ABILITY   REMS DRUG    Recommended Vitals/Observation:  Vitals: Obtain vitals prior to injection.  Observation: Patient may leave immediately following injection.        Weight Based Drug Calculations:    WEIGHT BASED DRUGS: NOT APPLICABLE / FLAT DOSE      1320  Patient tolerated injection well.  RTC on 6/11/2025.        Initiated By: MIREYA Anguiano-CNP

## 2025-01-06 ENCOUNTER — APPOINTMENT (OUTPATIENT)
Dept: AUDIOLOGY | Facility: CLINIC | Age: 75
End: 2025-01-06

## 2025-01-06 DIAGNOSIS — H90.3 SENSORINEURAL HEARING LOSS (SNHL) OF BOTH EARS: Primary | ICD-10-CM

## 2025-01-06 PROCEDURE — HRANC PR HEARING AID NO CHARGE: Performed by: AUDIOLOGIST

## 2025-01-06 NOTE — PROGRESS NOTES
HEARING AID EVALUATION    The patient was seen today for a hearing aid evaluation.  She has never worn hearing aids.  Discussed the various amplification options with the patient, including the various styles of hearing aids and the differences in technology levels.  She decided upon ordering two Resound Nexia 9 rechargeable RICs in the color champagne with #0 LP receivers.  Scheduled the fitting and follow up appointments.      APPOINTMENT TIME:  2:00-3:00

## 2025-01-08 ENCOUNTER — APPOINTMENT (OUTPATIENT)
Dept: OTOLARYNGOLOGY | Facility: CLINIC | Age: 75
End: 2025-01-08
Payer: MEDICARE

## 2025-01-08 DIAGNOSIS — E04.1 THYROID NODULE: Primary | ICD-10-CM

## 2025-01-08 DIAGNOSIS — E04.1 LEFT THYROID NODULE: ICD-10-CM

## 2025-01-08 PROCEDURE — 10006 FNA BX W/US GDN EA ADDL: CPT | Performed by: OTOLARYNGOLOGY

## 2025-01-08 PROCEDURE — 88112 CYTOPATH CELL ENHANCE TECH: CPT | Performed by: PATHOLOGY

## 2025-01-08 PROCEDURE — 10005 FNA BX W/US GDN 1ST LES: CPT | Performed by: OTOLARYNGOLOGY

## 2025-01-08 PROCEDURE — 88112 CYTOPATH CELL ENHANCE TECH: CPT

## 2025-01-08 NOTE — PROGRESS NOTES
Diagnosis: Left and isthmus thyroid nodule(s)  Procedure: Ultrasound guided fine-needle aspiration biopsy  Local anesthesia  Specimens sent to pathology in CytoLyt and Afirma solution  No bleeding  No complication  No implants  Findings: Real-time ultrasound was performed today which showed several nodules.  2 were biopsied.  Specimen A is a left midpole nodule measuring 3.8 x 2.7 x 1.8 cm.  Specimen B is an isthmus nodule which was on the left side of the isthmus measuring 1.5 x 1.3 x 1.4 cm.  It was mixed but the solid component was hypoechoic.  There were other nodules which were not biopsied today.  On the right side of the isthmus there is a 1.2 x 1.0 x 1.4 cm mixed nodule.  The solid component was more hyper coag.  On the right-hand side in the superior pole there is an isoechoic nodule measuring 0.8 x 1.3 x 0.9 cm.  There is also a subcentimeter cyst midpole on the right measuring 0.8 cm in greatest dimension.  --  The patient is aware of the procedure including the postoperative care and the risks, such as but not limited to, bleeding, infection, failure to get adequate specimen.  The patient does wish to proceed.  --  Description: After informed consent and time out the patient was placed on the table in a supine manner.  A shoulder roll was placed.  Diagnostic ultrasound was performed cataloging any nodule(s).  After this was done I anesthetized the skin with 1% Xylocaine with epinephrine.  Following this under direct ultrasound visualization and guidance a 25-gauge needle was visibly entered into the left thyroid nodule and gentle suction was applied obtaining a sample.  This was placed in fixative.  2 samples were placed in CytoLyt and 2 samples were placed in Afirma solution.  Postoperative ultrasound failed to show any hematoma or complication.  Attention was focused to the isthmus nodule on the left side and a ultrasound-guided fine-needle aspiration biopsy was performed on the [blank] thyroid nodule  in a similar fashion.  A postoperative ultrasound was again performed which showed no complication or hematoma.

## 2025-01-13 LAB
LABORATORY COMMENT REPORT: NORMAL
LABORATORY COMMENT REPORT: NORMAL
PATH REPORT.FINAL DX SPEC: NORMAL
PATH REPORT.GROSS SPEC: NORMAL
PATH REPORT.RELEVANT HX SPEC: NORMAL
PATH REPORT.TOTAL CANCER: NORMAL

## 2025-01-24 DIAGNOSIS — M06.9 RHEUMATOID ARTHRITIS, INVOLVING UNSPECIFIED SITE, UNSPECIFIED WHETHER RHEUMATOID FACTOR PRESENT (MULTI): ICD-10-CM

## 2025-01-24 RX ORDER — PREDNISONE 1 MG/1
3 TABLET ORAL DAILY
Qty: 270 TABLET | Refills: 0 | Status: SHIPPED | OUTPATIENT
Start: 2025-01-24

## 2025-01-28 ENCOUNTER — APPOINTMENT (OUTPATIENT)
Dept: AUDIOLOGY | Facility: CLINIC | Age: 75
End: 2025-01-28
Payer: MEDICARE

## 2025-01-28 DIAGNOSIS — H90.3 SENSORINEURAL HEARING LOSS (SNHL) OF BOTH EARS: Primary | ICD-10-CM

## 2025-01-28 NOTE — PROGRESS NOTES
HEARING AID FITTING    RIGHT: RESOUND NEXIA 9 RECHARGEABLE TESHA WITH A #0 LP  AND MEDIUM OPEN DOME WITH RETENTION TAIL  S.N.: [id number]  LEFT: RESOUND NEXIA 9 RECHARGEABLE TESHA WITH A #0 LP  AND MEDIUM OPEN DOME WITH RETENTION TAIL  S.N.: [id number]  WARRANTY EXPIRES: 2/9/2028    Fit the patient with the above listed hearing aids set to 80% for a first time user with a gain reduction at 5352-7533 Hz x6 in both hearing aids.  Discussed use and care of the hearing aids and practiced inserting the aids and adjusting the volume.  Showed the patient how to replace the wax guards.  I will have her return demonstrate at her follow up appointment.  Paired her hearing aids to her cell phone for streaming phone calls and audio.  I will pair the hearing aids to the Resound Smart 3D ashley at her next appointment.  The patient reported that she is already hearing better with her hearing aids.  She had a mask on when she came in today.  Let her know to be very careful when removing the mask while wearing her hearing aids.  In addition to today's verbal instruction of the hearing devices, the patient was given written instructions from the hearing aid . Hearing aid limitations were discussed at length as well as realistic expectations. The patient was advised in order to receive full benefit of amplification, consistent use during all waking hours is recommended.  The repair warranty and the conditions of the right-to-return period were discussed. The patient reports understanding of these conditions. Purchase agreement was signed (see scanned documents).  Patient will return in 1 week for a hearing aid check.     Appointment time: 3:30-4:30

## 2025-01-29 LAB
LABORATORY COMMENT REPORT: NORMAL
LABORATORY COMMENT REPORT: NORMAL
PATH REPORT.ADDENDUM SPEC: NORMAL
PATH REPORT.FINAL DX SPEC: NORMAL
PATH REPORT.GROSS SPEC: NORMAL
PATH REPORT.RELEVANT HX SPEC: NORMAL
PATH REPORT.TOTAL CANCER: NORMAL

## 2025-02-04 ENCOUNTER — APPOINTMENT (OUTPATIENT)
Dept: AUDIOLOGY | Facility: CLINIC | Age: 75
End: 2025-02-04
Payer: MEDICARE

## 2025-02-04 DIAGNOSIS — H90.3 SENSORINEURAL HEARING LOSS (SNHL) OF BOTH EARS: Primary | ICD-10-CM

## 2025-02-04 PROCEDURE — HRANC PR HEARING AID NO CHARGE: Performed by: AUDIOLOGIST

## 2025-02-04 NOTE — PROGRESS NOTES
HEARING AID CHECK     RIGHT: RESOUND NEXIA 9 RECHARGEABLE TESHA WITH A #0 LP  AND MEDIUM OPEN DOME WITH RETENTION TAIL  S.N.: [id number]  LEFT: RESOUND NEXIA 9 RECHARGEABLE TESHA WITH A #0 LP  AND MEDIUM OPEN DOME WITH RETENTION TAIL  S.N.: [id number]  WARRANTY EXPIRES: 2/9/2028     The patient is doing well and getting used to her hearing aids.  Had her return demonstrate how to replace the wax guards.  Raised the level to 100% for a first time user with a reduction in gain from 5843-6041 Hz x3.  Paired her hearing aids to the Resound Smart 3D ashley and demonstrated how to adjust the volume and change into the hear in noise program.  I will review the ashley more at her follow up appointment.  Reminded her to make sure the hearing aids are tucked in all the way properly.  Follow up in two weeks.       Appointment time: 3:00-3:30

## 2025-02-18 ENCOUNTER — APPOINTMENT (OUTPATIENT)
Dept: AUDIOLOGY | Facility: CLINIC | Age: 75
End: 2025-02-18
Payer: MEDICARE

## 2025-04-28 DIAGNOSIS — M06.9 RHEUMATOID ARTHRITIS, INVOLVING UNSPECIFIED SITE, UNSPECIFIED WHETHER RHEUMATOID FACTOR PRESENT (MULTI): ICD-10-CM

## 2025-04-28 RX ORDER — PREDNISONE 1 MG/1
3 TABLET ORAL DAILY
Qty: 270 TABLET | Refills: 0 | Status: SHIPPED | OUTPATIENT
Start: 2025-04-28

## 2025-05-12 ENCOUNTER — OFFICE VISIT (OUTPATIENT)
Dept: RHEUMATOLOGY | Facility: CLINIC | Age: 75
End: 2025-05-12
Payer: MEDICARE

## 2025-05-12 VITALS — SYSTOLIC BLOOD PRESSURE: 124 MMHG | BODY MASS INDEX: 26.27 KG/M2 | DIASTOLIC BLOOD PRESSURE: 66 MMHG | WEIGHT: 134.5 LBS

## 2025-05-12 DIAGNOSIS — M19.90 OSTEOARTHRITIS, UNSPECIFIED OSTEOARTHRITIS TYPE, UNSPECIFIED SITE: ICD-10-CM

## 2025-05-12 DIAGNOSIS — M81.0 OSTEOPOROSIS, UNSPECIFIED OSTEOPOROSIS TYPE, UNSPECIFIED PATHOLOGICAL FRACTURE PRESENCE: ICD-10-CM

## 2025-05-12 DIAGNOSIS — M06.9 RHEUMATOID ARTHRITIS, INVOLVING UNSPECIFIED SITE, UNSPECIFIED WHETHER RHEUMATOID FACTOR PRESENT (MULTI): Primary | ICD-10-CM

## 2025-05-12 DIAGNOSIS — M25.562 LEFT KNEE PAIN, UNSPECIFIED CHRONICITY: ICD-10-CM

## 2025-05-12 PROCEDURE — 1159F MED LIST DOCD IN RCRD: CPT | Performed by: INTERNAL MEDICINE

## 2025-05-12 PROCEDURE — 3078F DIAST BP <80 MM HG: CPT | Performed by: INTERNAL MEDICINE

## 2025-05-12 PROCEDURE — 20610 DRAIN/INJ JOINT/BURSA W/O US: CPT | Mod: LT | Performed by: INTERNAL MEDICINE

## 2025-05-12 PROCEDURE — 2500000004 HC RX 250 GENERAL PHARMACY W/ HCPCS (ALT 636 FOR OP/ED): Performed by: INTERNAL MEDICINE

## 2025-05-12 PROCEDURE — 3074F SYST BP LT 130 MM HG: CPT | Performed by: INTERNAL MEDICINE

## 2025-05-12 PROCEDURE — 99214 OFFICE O/P EST MOD 30 MIN: CPT | Performed by: INTERNAL MEDICINE

## 2025-05-12 PROCEDURE — 99214 OFFICE O/P EST MOD 30 MIN: CPT | Mod: 25 | Performed by: INTERNAL MEDICINE

## 2025-05-12 RX ORDER — TRIAMCINOLONE ACETONIDE 40 MG/ML
40 INJECTION, SUSPENSION INTRA-ARTICULAR; INTRAMUSCULAR
Status: COMPLETED | OUTPATIENT
Start: 2025-05-12 | End: 2025-05-12

## 2025-05-12 RX ADMIN — TRIAMCINOLONE ACETONIDE 40 MG: 40 INJECTION, SUSPENSION INTRA-ARTICULAR; INTRAMUSCULAR at 14:45

## 2025-05-12 NOTE — PROGRESS NOTES
Recheck  OA  /  RA  /  OP  ( Due Prolia - June - Tript )  Left knee sprain with bending while sitting down x 2 weeks.      HPI - She slipped when she went to sit down - she didn't fall, but her L knee twisted, and it hurts.  R knee hurts.  Her pain mgmt dr has given her R knee injections.  No other partic pain.   No swelling.  AM stiffness 5 min.   +CP - has appt with her cardiol.  +SOB - sees pulm.  No GI.  Hasn't had time for DEXA    PE  NAD  RRR no r/m/g  CTA  1+ BLE edema  No synovitis  L knee tender and pain with ROM  B shin tenderness    A/P - OA and RA, recent L knee pain after twisting it but otherwise stable on low-dose pred.  Mult med intolerances  L knee injection - expl risks/benefits.  Pt gave written consent.  Aseptic tech, injected with 40mg kenalog and 1ml 1% lido  OP - on prolia - check labs prior  Still needs DEXA  Follow up 6 mo or sooner PRN    Patient ID: Sania Rosenberg is a 74 y.o. female.    Large Joint Injection/Arthrocentesis: L knee on 5/12/2025 2:45 PM  Indications: pain  Medications: 40 mg triamcinolone acetonide 40 mg/mL  Procedure, treatment alternatives, risks and benefits explained, specific risks discussed. Consent was given by the patient.

## 2025-05-16 LAB
25(OH)D3+25(OH)D2 SERPL-MCNC: 48 NG/ML (ref 30–100)
CA-I SERPL-MCNC: 5.5 MG/DL (ref 4.7–5.5)
CREAT SERPL-MCNC: 1.03 MG/DL (ref 0.6–1)
EGFRCR SERPLBLD CKD-EPI 2021: 56 ML/MIN/1.73M2

## 2025-06-11 ENCOUNTER — APPOINTMENT (OUTPATIENT)
Dept: INFUSION THERAPY | Facility: CLINIC | Age: 75
End: 2025-06-11
Payer: MEDICARE

## 2025-06-11 VITALS
TEMPERATURE: 97.6 F | SYSTOLIC BLOOD PRESSURE: 121 MMHG | RESPIRATION RATE: 18 BRPM | HEART RATE: 70 BPM | OXYGEN SATURATION: 94 % | DIASTOLIC BLOOD PRESSURE: 76 MMHG

## 2025-06-11 DIAGNOSIS — M81.0 OSTEOPOROSIS, UNSPECIFIED OSTEOPOROSIS TYPE, UNSPECIFIED PATHOLOGICAL FRACTURE PRESENCE: ICD-10-CM

## 2025-06-11 PROCEDURE — 96372 THER/PROPH/DIAG INJ SC/IM: CPT | Performed by: NURSE PRACTITIONER

## 2025-06-11 RX ORDER — FAMOTIDINE 10 MG/ML
20 INJECTION, SOLUTION INTRAVENOUS ONCE AS NEEDED
OUTPATIENT
Start: 2025-12-05

## 2025-06-11 RX ORDER — ALBUTEROL SULFATE 0.83 MG/ML
3 SOLUTION RESPIRATORY (INHALATION) AS NEEDED
OUTPATIENT
Start: 2025-12-05

## 2025-06-11 RX ORDER — EPINEPHRINE 0.3 MG/.3ML
0.3 INJECTION SUBCUTANEOUS EVERY 5 MIN PRN
OUTPATIENT
Start: 2025-12-05

## 2025-06-11 RX ORDER — DIPHENHYDRAMINE HYDROCHLORIDE 50 MG/ML
50 INJECTION, SOLUTION INTRAMUSCULAR; INTRAVENOUS AS NEEDED
OUTPATIENT
Start: 2025-12-05

## 2025-06-11 ASSESSMENT — ENCOUNTER SYMPTOMS
LEG SWELLING: 0
COUGH: 1
SHORTNESS OF BREATH: 1
DIZZINESS: 1
WHEEZING: 0
WOUND: 0
LIGHT-HEADEDNESS: 1
NUMBNESS: 0
EXTREMITY WEAKNESS: 0
PALPITATIONS: 0

## 2025-06-11 NOTE — PROGRESS NOTES
[facility]   Infusion Clinic Note   Date: 2025   Name: Sania Rosenberg  : 1950   MRN: [id number]          Reason for Visit: Follow-up and Injections (6 month Prolia 60mg subcutaneous/injection)         Today: We administered denosumab.       Visit Type: INFUSION       Ordered By: Dr. Pham       Accompanied by:       Diagnosis: Osteoporosis, unspecified osteoporosis type, unspecified pathological fracture presence        Allergies:   Allergies as of 2025 - Reviewed 2025   Allergen Reaction Noted    Leflunomide Anaphylaxis and Swelling 2023    Penicillins Hives and Anaphylaxis 2023    Acetaminophen-codeine Swelling 2023    Albuterol Other, Headache, and Dizziness 2023    Clindamycin Rash and Swelling 2023    Ibuprofen-famotidine Rash and Swelling 2023    Lanolin Swelling 2023    Tramadol Nausea/vomiting, GI Upset, and Rash 2023    Calamine Unknown 2023    Calamine-zinc oxide Unknown 2023    Lactose Unknown 2023    Losartan potassium Other 2023    Cephalexin Itching, Swelling, and Rash 2023    Ciprofloxacin Rash 2023    Clarithromycin GI Upset 2023    Codeine Rash 2023    Levofloxacin Rash 2023    Lisinopril Rash, Other, and GI Upset 2023    Methotrexate GI Upset and Rash 2023    Neomycin-bacitracin-polymyxin Rash 2023    Nicotine Itching 2023    Other Rash 2023    Oxycodone-acetaminophen Rash 2023    Pregabalin Rash 2023    Propoxyphene n-acetaminophen Rash 2023    Sulfa (sulfonamide antibiotics) Rash 2023    Sulfamethoxazole-trimethoprim Rash 2023    Tetracycline Rash 2023          Current Medications has a current medication list which includes the following prescription(s): accu-chek nadia plus test strp, aspirin, accu-chek nadia plus test strp, bromfenac, cetirizine, cholecalciferol, prolia,  furosemide, gabapentin, gabapentin, hydrocodone-acetaminophen, isosorbide mononitrate er, lidocaine, metoprolol tartrate, nitrostat, oxygen, oxymetazoline, peg 400-propylene glycol, prednisone, simvastatin, and anoro ellipta.       Vitals:   Vitals:    06/11/25 1356   BP: 121/76   Pulse: 70   Resp: 18   Temp: 36.4 °C (97.6 °F)   TempSrc: Temporal   SpO2: 94%             Infusion Pre-procedure Checklist:   - Allergies reviewed: yes   - Medications reviewed: yes       - Previous reaction to current treatment: no      Assess patient for the concerns below. Document provider notification as appropriate.  - Active or recent infection with/without current antibiotic use: no  - Recent or planned invasive dental work: no  - Recent or planned surgeries: no  - Recently received or plans to receive vaccinations: yes, Flu vaccine 10/31/2024  - Has treatment related toxicities: no  - Is pregnant:  n/a      Pain: 3   - Is the pain different from normal: no   - Is your Doctor aware:  yes       Labs: reviewedresults from 11/12/2024- See below          Fall Risk Screening: Dodson Fall Risk  History of Falling, Immediate or Within 3 Months: No  Secondary Diagnosis: No  Ambulatory Aid: Crutches/cane/walker (Patients uses a cane)  Intravenous Therapy/Heparin Lock: No  Gait/Transferring: Weak  Mental Status: Oriented to own ability  Dodson Fall Risk Score: 25       Review Of Systems:  Review of Systems   Respiratory:  Positive for cough and shortness of breath. Negative for wheezing.         S/P  lung cancer 2017     Cardiovascular:  Positive for chest pain. Negative for leg swelling and palpitations.        Patient has angina.   Patient is on medication and is under  a cardiologist care   Musculoskeletal:  Positive for gait problem.   Skin:  Negative for itching, rash and wound.   Neurological:  Positive for dizziness, gait problem and light-headedness. Negative for extremity weakness and numbness.        Patient states she is on Norco  due to pain and these are the side effects         ROS completed? Yes      Infusion Readiness:  - Assessment Concerns Related to Infusion: No  - Provider notified: no      Document Below Only If Indicated:   New Patient Education:    N/A (returning patient for continuation of therapy. Ongoing education provided as needed.)        Treatment Conditions & Drug Specific Questions:    Denosumab  (PROLIA. XGEVA)    (Unless otherwise specified on patient specific therapy plan):     TREATMENT CONDITIONS:  Unless otherwise specified on patient specific therapy plan HOLD and notify provider prior to proceeding with today's injection if patients:  o Calcium is LESS THAN 8.6 mg/dL OR  Ionized Calcium LESS THAN 1.1 mmol/L  o Recent or planned invasive dental procedure (within 4 weeks)    Lab Results   Component Value Date    CALCIUM 9.6 05/13/2024    PHOS 2.2 (L) 01/19/2019      Lab Results   Component Value Date    CAION 5.5 05/15/2025       Labs reviewed and patient meets treatment conditions? Yes    DRUG SPECIFIC QUESTIONS:  Is the patient taking calcium and vitamin D? Yes  (Recommended)    Pt Instructed on following risks: (1) hypocalcemia, (2) osteonecrosis of the jaw, (3) atypical femoral fractures, (4) serious infections, and (5) dermatologic reactions?  Yes      REMINDER:  PREGNANCY CATEGORY X DRUG. OBTAIN NEGTATIVE PREGNANCY TEST PRIOR TO FIRST INFUSION FOR WOMEN OF CHILDBEARING ABILITY   REMS DRUG    Recommended Vitals/Observation:  Vitals: Obtain vitals prior to injection.  Observation: Patient may leave immediately following injection.        Weight Based Drug Calculations:    WEIGHT BASED DRUGS: NOT APPLICABLE / FLAT DOSE      1320  Patient tolerated injection well.  RTC on 6/11/2025.        Initiated By: Irina Tyson LPN   _________________________________________________________________________    Note authored and patient cared for by: Irina Tyson LPN   Note/Encounter reviewed by: Gisselle Vera  KEYANA NP. This provider on site at time of patient injection. Staff to notify this provider of any questions, concerns, abnormals or issues during encounter. No issues reported. Pt. tolerated without difficulty. Pt. not independently evaluated by this provider during today's encounter

## 2025-06-11 NOTE — PATIENT INSTRUCTIONS
Today :We administered denosumab. 6 month Prolia 60mg subcutaneous injection in the left upper arm.  Blood calcium level to be drawn within 28 days of next Prolia appointment.    For:   1. Osteoporosis, unspecified osteoporosis type, unspecified pathological fracture presence       Your next appointment is due in:  6 month        Please read the  Medication Guide that was given to you and reviewed during todays visit.     (Tell all doctors including dentists that you are taking this medication)     Go to the emergency room or call 911 if:  -You have signs of allergic reaction:   -Rash, hives, itching.   -Swollen, blistered, peeling skin.   -Swelling of face, lips, mouth, tongue or throat.   -Tightness of chest, trouble breathing, swallowing or talking     Call your doctor:  - If injection site gets red, warm, swollen, itchy or leaks fluid or pus.     (Leave band aid on your injection site for at least 2 hours and keep area clean and dry  - If you get sick or have symptoms of infection or are not feeling well for any reason.    (Wash your hands often, stay away from people who are sick)  - If you have side effects from your medication that do not go away or are bothersome.     (Refer to the teaching your nurse gave you for side effects to call your doctor about)    - Common side effects may include:  stuffy nose, headache, feeling tired, muscle aches, upset stomach  - Before receiving any vaccines     - Call the [facility] at   If:  - You get sick, are on antibiotics, have had a recent vaccine, have surgery or dental work and your doctor wants your visit rescheduled.  - You need to cancel and reschedule your visit for any reason. Call at least 2 days before your visit if you need to cancel.   - Your insurance changes before your next visit.    (We will need to get approval from your new insurance. This can take up to two weeks.)     The [facility] is opened Monday thru Friday. We are  closed on weekends and holidays.   Voice mail will take your call if the center is closed. If you leave a message please allow 24 hours for a call back during weekdays. If you leave a message on a weekend/holiday, we will call you back the next business day.    A pharmacist is available Monday - Friday from 8:30AM to 3:30PM to help answer any questions you may have about your prescriptions(s). Please call pharmacy at:    [facility]: [phone]  [facility]: [phone]  [facility]: [phone]

## 2025-07-01 ENCOUNTER — HOSPITAL ENCOUNTER (OUTPATIENT)
Dept: RADIOLOGY | Facility: HOSPITAL | Age: 75
Discharge: HOME | End: 2025-07-01
Payer: MEDICARE

## 2025-07-01 DIAGNOSIS — E04.1 THYROID NODULE: ICD-10-CM

## 2025-07-01 PROCEDURE — 76536 US EXAM OF HEAD AND NECK: CPT

## 2025-07-01 PROCEDURE — 76536 US EXAM OF HEAD AND NECK: CPT | Performed by: RADIOLOGY

## 2025-07-07 ENCOUNTER — APPOINTMENT (OUTPATIENT)
Dept: OTOLARYNGOLOGY | Facility: CLINIC | Age: 75
End: 2025-07-07
Payer: MEDICARE

## 2025-07-07 VITALS — HEIGHT: 60 IN | BODY MASS INDEX: 27.68 KG/M2 | WEIGHT: 141 LBS

## 2025-07-07 DIAGNOSIS — E04.1 THYROID NODULE: ICD-10-CM

## 2025-07-07 DIAGNOSIS — H90.3 BILATERAL SENSORINEURAL HEARING LOSS: Primary | ICD-10-CM

## 2025-07-07 DIAGNOSIS — E04.2 NONTOXIC MULTINODULAR GOITER: ICD-10-CM

## 2025-07-07 PROCEDURE — 1159F MED LIST DOCD IN RCRD: CPT | Performed by: OTOLARYNGOLOGY

## 2025-07-07 PROCEDURE — 99214 OFFICE O/P EST MOD 30 MIN: CPT | Performed by: OTOLARYNGOLOGY

## 2025-07-07 NOTE — PROGRESS NOTES
Chief Complaint   Patient presents with    Thyroid Problem     LOV: 11/2024 THYROID CHECK, PREVIOUSLY HAD BENIGN FNA DONE 1/2025, HAD US AND LAB WORK DONE     HPI:  Sania Rosenberg is a 74 y.o. female who presents today for continued evaluation of her multinodular goiter.    She had needle biopsy of a dominant left-sided nodule in January 2025 which showed this to be benign by Afirma.    An isthmus cystic nodule was nondiagnostic.    She just had repeat ultrasound on July 1, 2025 which showed stable right and isthmus nodules but the benign left-sided nodule has increased in size but   She is without any complaints of sore throat, hoarseness, dysphagia, neck pain, neck mass   Also has a 2-year history complaints of asymmetric left-sided hearing loss.  No significant pressure, pain, tinnitus.    Audiogram was performed which does show bilateral high-frequency sensorineural hearing loss.  In addition to this she is found on CT scan of the chest to have dominant left-sided thyroid nodule.  Prior CT scans of the chest dating back to 2013 reveal nodular thyroid gland.  She did have ultrasound of this performed on October 17, 2024 which shows bilateral dominant and 1 isthmus nodule.  History of right lung cancer status post resection in 2017.  --  1/8/25 PATH:  Final Cytological Interpretation   A. THYROID FINE NEEDLE ASPIRATION LEFT MID LOBE- Left midpole nodule measuring 3.8 x 2.7 x 1.8 cm  --Follicular lesion of undetermined significance, see comment.     Comment: Molecular testing can be performed on the current specimen on clinician request.                   B. THYROID FINE NEEDLE ASPIRATION ISTHMUS - Left side of isthmus measuring 1.5 x 1.3 x 1.4 cm.  Mixed with a hypoechoic solid component  --Non-diagnostic specimen; cyst fluid only.   Electronically signed by Karina Marrufo DO on 1/13/2025 at 1318 EST      TRIP   Slide(s) initially screened by JUAN JOSÉ Garza at [facility] [address]  [place] [address]  By the signature on this report, the individual or group listed as making the Final Interpretation/Diagnosis certifies that they have reviewed this case.    Addendum   THYROID NODULE A WAS SENT TO [facility] IN [place], [place] FOR AFIRMA GENOMIC SEQUENCING . [facility] NUMBER: [id number], [id number]     RESULTS:  NODULE                        A   Thyroid, Middle Left, 3.8 cm     AFIRMA GENOMIC SEQUENCING   Ensemble :                              Benign (Risk of Malignancy approximately 4%)  Xpression Hendricks:                                     N/A  Other Classifiers:               BRAF p. [id number] c. 1799>A:       Negative               MTC:                                           Negative               RET/PTC1, RET/PTC3:              Not Detected               Parathyroid:                               Negative     TERT PROMOTER REGION     TERT c.-124C>T (C228T):  Test Not Performed (TNP)  TERT c.-146C>T (C250T):  Test Not Performed (TNP)     NODULE A RESULTS SUMMARY:  The result of this 3.8cm [place] III nodule A is Afirma GSC Benign, which suggests a low risk of cancer at approximately 4%.  Treatment like a cytologically benign nodule may be appropriate, including clinical correlation. Afirma XA is not performed on GSC Benign nodules. TERT promoter region analysis is not performed on [facility] Benign noduels.   --  US THYROID;  7/1/2025       COMPARISON:  10/17/2024      FINDINGS:       RIGHT LOBE:  The right lobe of the thyroid measures 1.7 cm x 1.7 cm x 5.6 cm. The  right lobe of the thyroid is heterogeneous in echotexture and  demonstrates multiple nodules.. Many are cystic anterior 1. There is  a hypoechoic solid nodule measuring 13 x 9 x 11 mm and TR 4. No  significant interval change.      LEFT LOBE:  The left lobe of the thyroid measures 1.9 cm x 2.8 cm x 4.8 cm. The  left lobe of the thyroid is heterogeneous. It contains a  dominant  heterogeneous mostly solid nodule. This is isoechoic. This is TR 3  measuring 42 x 25 x 32 mm. This is larger than the prior exam.      ISTHMUS:  The isthmus measures approximately 1.0 cm and is homogeneous in  echotexture a solid and cystic TR 2 nodule measuring 13 x 10 x 13 mm.      CERVICAL LYMPH NODES:  No significant adenopathy.      IMPRESSION:  Heterogeneous thyroid nodules as described.  --  US THYROID;  10/17/2024       FINDINGS:      RIGHT LOBE:  The right lobe of the thyroid measures 1.7 cm x 2.0 cm x 5.5 cm. The  right lobe of the thyroid is homogeneous in echotexture and  demonstrates nodules described below. Cystic or almost completely  cystic nodule measuring up to 7 mm on image 19, categorized as a TR 1  nodule. There is a solid hypoechoic wider than tall nodule with  somewhat lobulated margins and without internal echogenic foci,  measuring up to 1.2 cm. This is categorized as a TIRADS category 4  nodule.          LEFT LOBE:  The left lobe of the thyroid measures 2.1 cm x 2.6 cm x 5.1 cm. The  left lobe of the thyroid is homogeneous in echotexture and  demonstrates nodule described below.      Hypoechoic wider than tall nodule with ill-defined margins and  without definite internal echogenic foci, measuring up to 3.2 cm,  categorized as a TIRADS category 4 nodule. (Image 49).      ISTHMUS:  The isthmus measures approximately 0.5 cm and is homogeneous in  echotexture and demonstrates a 1.5 cm TIRADS category 4 nodule..      CERVICAL LYMPH NODES:  No cervical lymphadenopathy is present.      IMPRESSION:  1. In the left thyroid lobe, there is a 3.2 cm TIRADS category 4  nodule. Based on the TIRADS criteria, fine-needle aspiration is  suggested for this.  2. Additional TIRADS category 4 nodules measuring up to 1.5 cm in the  right thyroid lobe and isthmus for which follow-up is recommended in  1, 2, 3, and 5 years..  --  PMH:  History reviewed. No pertinent past medical history.  Past Surgical  History:   Procedure Laterality Date    APPENDECTOMY  2015    Appendectomy    CARDIAC CATHETERIZATION N/A 3/18/2024    Procedure: Left Heart Cath, No LV;  Surgeon: Jerad Doherty MD;  Location: [facility] Cardiac [facility] Lab;  Service: Cardiovascular;  Laterality: N/A;  no auth required     SECTION, CLASSIC  2015     Section    CHOLECYSTECTOMY  2015    Cholecystectomy    CT GUIDED PERCUTANEOUS PERITONEAL OR RETROPERITONEAL FLUID COLLECTION DRAINAGE  2019    CT GUIDED PERCUTANEOUS PERITONEAL OR RETROPERITONEAL FLUID COLLECTION DRAINAGE [facility]    TONSILLECTOMY  2015    Tonsillectomy         Medications:     Current Outpatient Medications:     Accu-Chek Aicha Plus test strp strip, 100 Strips once daily. Use as instructed to test blood sugars once daily., Disp: , Rfl:     aspirin 81 mg chewable tablet, Chew 1 tablet (81 mg) once daily., Disp: , Rfl:     blood sugar diagnostic (Accu-Chek Aicha Plus test strp) strip, use as directed once daily., Disp: , Rfl:     bromfenac (Xibrom) 0.09 % ophthalmic solution, Administer 1 drop into both eyes 2 times a day., Disp: , Rfl:     cetirizine (ZyrTEC) 10 mg capsule, Take 1 capsule (10 mg) by mouth 2 times a day., Disp: , Rfl:     cholecalciferol (Vitamin D-3) 25 MCG (1000 UT) tablet, Take 1 tablet (25 mcg) by mouth once daily., Disp: , Rfl:     denosumab (Prolia) 60 mg/mL syringe, Inject 1 mL (60 mg) under the skin every 6 months., Disp: , Rfl:     furosemide (Lasix) 20 mg tablet, Take 1 tablet (20 mg) by mouth every other day., Disp: , Rfl:     gabapentin (Neurontin) 400 mg capsule, Take 1 capsule (400 mg) by mouth 2 times a day., Disp: , Rfl:     gabapentin (Neurontin) 400 mg capsule, Take 1 capsule by mouth twice daily, Disp: 60 capsule, Rfl: 3    HYDROcodone-acetaminophen (Norco) 5-325 mg tablet, Take 1 tablet by mouth every 8 hours as needed, Disp: 90 tablet, Rfl: 0    isosorbide mononitrate ER (Imdur) 30 mg 24 hr tablet,  "Take 1 tablet (30 mg) by mouth once daily., Disp: , Rfl:     lidocaine 4 % patch, Place 1 patch on the skin once daily., Disp: , Rfl:     metoprolol tartrate (Lopressor) 25 mg tablet, Take 1 tablet (25 mg) by mouth 2 times a day., Disp: , Rfl:     Nitrostat 0.4 mg SL tablet, Place 1 tablet (0.4 mg) under the tongue every 5 minutes if needed., Disp: , Rfl:     oxygen (O2) gas therapy, once daily at bedtime., Disp: , Rfl:     oxymetazoline 0.05 % nasal spray, Administer 1 spray into affected nostril(s) twice a day., Disp: , Rfl:     peg 400-propylene glycol (SYSTANE) 0.4-0.3 % drops ophthalmic drops, Administer 2 drops into both eyes 2 times a day as needed., Disp: , Rfl:     predniSONE (Deltasone) 1 mg tablet, Take 3 tablets (3 mg) by mouth once daily., Disp: 270 tablet, Rfl: 0    simvastatin (Zocor) 10 mg tablet, Take 1 tablet (10 mg) by mouth once daily at bedtime., Disp: , Rfl:     umeclidinium-vilanteroL (Anoro Ellipta) 62.5-25 mcg/actuation blister with device, Inhale 1 puff once daily., Disp: , Rfl:      Allergies:  Allergies   Allergen Reactions    Leflunomide Anaphylaxis and Swelling    Penicillins Hives and Anaphylaxis    Acetaminophen-Codeine Swelling    Albuterol Other, Headache and Dizziness     TREMORS    Clindamycin Rash and Swelling    Ibuprofen-Famotidine Rash and Swelling    Lanolin Swelling    Tramadol Nausea/vomiting, GI Upset and Rash    Calamine Unknown    Calamine-Zinc Oxide Unknown    Lactose Unknown    Losartan Potassium Other     \"high heart rate    Cephalexin Itching, Swelling and Rash    Ciprofloxacin Rash    Clarithromycin GI Upset    Codeine Rash    Levofloxacin Rash    Lisinopril Rash, Other and GI Upset     numbness    Methotrexate GI Upset and Rash    Neomycin-Bacitracin-Polymyxin Rash    Nicotine Itching    Other Rash     Adhesive Nicoderm Smoking Patch    Oxycodone-Acetaminophen Rash    Pregabalin Rash    Propoxyphene N-Acetaminophen Rash    Sulfa (Sulfonamide Antibiotics) Rash    " Sulfamethoxazole-Trimethoprim Rash    Tetracycline Rash        ROS:  Review of systems normal unless stated otherwise in the HPI and/or PMH.    Physical Exam:  Height (!) 1.524 m (5'), weight 64 kg (141 lb). Body mass index is 27.54 kg/m².     GENERAL APPEARANCE: Well developed and well nourished.  Alert and oriented in no acute distress.  Normal vocal quality.      HEAD/FACE: No erythema or edema or facial tenderness.  Normal facial nerve function bilaterally.    EAR:       EXTERNAL: Normal pinnas and external canals.       MIDDLE EAR: Tympanic membranes intact and mobile with normal landmarks.  Middle ear space appears well aerated.       TUBE STATUS: N/A       MASTOID CAVITY: N/A       HEARING: Gross hearing assessment is within normal limits.      NOSE:       VISUALIZED USING: Anterior rhinoscopy with headlight and nasal speculum.       DORSUM: Midline, nontraumatic appearance.       MUCOSA: Normal-appearing.       SECRETIONS: Normal.       SEPTUM: Midline and nonobstructing.       INFERIOR TURBINATES: Normal.       MIDDLE TURBINATES/MEATUS: N/A       BLEEDING: N/A         ORAL CAVITY/PHARYNX:       TEETH: Adequate dentition.       TONGUE: No mass or lesion.  Normal mobility.       FLOOR OF MOUTH: No mass or lesion.       PALATE: Normal hard palate, soft palate, and uvula.       OROPHARYNX: Normal without mass or lesion.  Tonsils absent       BUCCAL MUCOSA/GBS: Normal without mass or lesion.       LIPS: Normal.    LARYNX/HYPOPHARYNX/NASOPHARYNX: N/A    NECK: No palpable masses or abnormal adenopathy.  Trachea is midline.    THYROID: Fullness and enlargement left lobe.    SALIVARY GLANDS: Normal bilateral parotid and submandibular glands by inspection and palpation.    TMJ's: Normal.    NEURO: Cranial nerve exam grossly normal bilaterally.       Assessment/Plan   Sania was seen today for thyroid problem.  Diagnoses and all orders for this visit:  Bilateral sensorineural hearing loss (Primary)  Thyroid nodule  -      US thyroid; Future  -     US thyroid; Future  -     US thyroid; Future  Nontoxic multinodular goiter    Her dominant isthmus and right sided nodule are stable.  Left side which have benign needle biopsy in January 2025 has increased in size.  Affirm does carry about a 4% error rate so we will recheck an ultrasound in 6 months to make sure there is no continued increased growth.  Follow up in about 6 months (around 1/7/2026) for test results after testing is complete.     Negrito Keys MD

## 2025-07-16 DIAGNOSIS — M06.9 RHEUMATOID ARTHRITIS, INVOLVING UNSPECIFIED SITE, UNSPECIFIED WHETHER RHEUMATOID FACTOR PRESENT (MULTI): ICD-10-CM

## 2025-07-18 RX ORDER — PREDNISONE 1 MG/1
3 TABLET ORAL DAILY
Qty: 270 TABLET | Refills: 0 | Status: SHIPPED | OUTPATIENT
Start: 2025-07-18

## 2025-12-15 ENCOUNTER — APPOINTMENT (OUTPATIENT)
Dept: INFUSION THERAPY | Facility: CLINIC | Age: 75
End: 2025-12-15
Payer: MEDICARE

## (undated) DEVICE — CATHETER, EXPO, MODEL-D, 6FR FL4

## (undated) DEVICE — INTRODUCER, SHEATH, AVANTI PLUS, W/MINI WIRE, STANDARD, 6MS FR, 11 CM

## (undated) DEVICE — COVER, EQUIPMENT, ZERO GRAVITY

## (undated) DEVICE — CATHETER, EXPO, MODEL-D, 6FR PIG 110CM

## (undated) DEVICE — GUIDEWIRE, J TIP, 3 MM, 0.035 IN X 150 CM, PTFE

## (undated) DEVICE — PACK, ANGIO P2, CUSTOM, LAKE

## (undated) DEVICE — CATHETER, EXPO, MODEL-D, 6FR FR4

## (undated) DEVICE — KIT, NAMIC STANDARD LEFT HEART, CUSTOM, LWMC

## (undated) DEVICE — INTRODUCER SHEATH, GLIDESHEATH, 6FR 25CM

## (undated) DEVICE — GUIDEWIRE, J TIP, 3 MM, 0.035 IN X 260 CM, PTFE